# Patient Record
Sex: FEMALE | Race: WHITE | NOT HISPANIC OR LATINO | URBAN - METROPOLITAN AREA
[De-identification: names, ages, dates, MRNs, and addresses within clinical notes are randomized per-mention and may not be internally consistent; named-entity substitution may affect disease eponyms.]

---

## 2017-02-23 ENCOUNTER — ALLSCRIPTS OFFICE VISIT (OUTPATIENT)
Dept: OTHER | Facility: OTHER | Age: 52
End: 2017-02-23

## 2017-02-23 DIAGNOSIS — E03.9 HYPOTHYROIDISM: ICD-10-CM

## 2017-02-23 DIAGNOSIS — G43.909 MIGRAINE WITHOUT STATUS MIGRAINOSUS, NOT INTRACTABLE: ICD-10-CM

## 2017-09-19 ENCOUNTER — ALLSCRIPTS OFFICE VISIT (OUTPATIENT)
Dept: OTHER | Facility: OTHER | Age: 52
End: 2017-09-19

## 2017-09-19 DIAGNOSIS — R92.2 INCONCLUSIVE MAMMOGRAM: ICD-10-CM

## 2017-09-19 DIAGNOSIS — Z12.31 ENCOUNTER FOR SCREENING MAMMOGRAM FOR MALIGNANT NEOPLASM OF BREAST: ICD-10-CM

## 2017-09-19 PROCEDURE — G0145 SCR C/V CYTO,THINLAYER,RESCR: HCPCS | Performed by: OBSTETRICS & GYNECOLOGY

## 2017-09-19 PROCEDURE — 87624 HPV HI-RISK TYP POOLED RSLT: CPT | Performed by: OBSTETRICS & GYNECOLOGY

## 2017-09-20 ENCOUNTER — LAB REQUISITION (OUTPATIENT)
Dept: LAB | Facility: HOSPITAL | Age: 52
End: 2017-09-20
Payer: COMMERCIAL

## 2017-09-20 DIAGNOSIS — Z01.419 ENCOUNTER FOR GYNECOLOGICAL EXAMINATION WITHOUT ABNORMAL FINDING: ICD-10-CM

## 2017-09-22 LAB — HPV RRNA GENITAL QL NAA+PROBE: NORMAL

## 2017-09-28 LAB
LAB AP GYN PRIMARY INTERPRETATION: NORMAL
Lab: NORMAL

## 2018-01-13 VITALS
DIASTOLIC BLOOD PRESSURE: 70 MMHG | SYSTOLIC BLOOD PRESSURE: 114 MMHG | BODY MASS INDEX: 25.34 KG/M2 | WEIGHT: 143 LBS | HEIGHT: 63 IN

## 2018-01-14 VITALS
DIASTOLIC BLOOD PRESSURE: 70 MMHG | SYSTOLIC BLOOD PRESSURE: 108 MMHG | TEMPERATURE: 98.7 F | BODY MASS INDEX: 25.34 KG/M2 | HEART RATE: 68 BPM | WEIGHT: 143 LBS | OXYGEN SATURATION: 97 % | HEIGHT: 63 IN

## 2018-03-21 PROBLEM — I49.3 PVC (PREMATURE VENTRICULAR CONTRACTION): Status: ACTIVE | Noted: 2017-02-23

## 2018-03-21 PROBLEM — G43.909 MIGRAINE: Status: ACTIVE | Noted: 2017-02-23

## 2018-03-21 PROBLEM — E03.9 HYPOTHYROIDISM, ADULT: Status: ACTIVE | Noted: 2017-02-23

## 2018-03-22 ENCOUNTER — APPOINTMENT (OUTPATIENT)
Dept: LAB | Facility: CLINIC | Age: 53
End: 2018-03-22
Payer: COMMERCIAL

## 2018-03-22 ENCOUNTER — TRANSCRIBE ORDERS (OUTPATIENT)
Dept: LAB | Facility: CLINIC | Age: 53
End: 2018-03-22

## 2018-03-22 ENCOUNTER — OFFICE VISIT (OUTPATIENT)
Dept: INTERNAL MEDICINE CLINIC | Facility: CLINIC | Age: 53
End: 2018-03-22
Payer: COMMERCIAL

## 2018-03-22 VITALS
HEART RATE: 62 BPM | RESPIRATION RATE: 16 BRPM | DIASTOLIC BLOOD PRESSURE: 68 MMHG | WEIGHT: 146.8 LBS | HEIGHT: 62 IN | BODY MASS INDEX: 27.02 KG/M2 | OXYGEN SATURATION: 99 % | SYSTOLIC BLOOD PRESSURE: 130 MMHG

## 2018-03-22 DIAGNOSIS — I49.3 PVC (PREMATURE VENTRICULAR CONTRACTION): ICD-10-CM

## 2018-03-22 DIAGNOSIS — Z13.6 SCREENING FOR HEART DISEASE: ICD-10-CM

## 2018-03-22 DIAGNOSIS — G43.909 MIGRAINE WITHOUT STATUS MIGRAINOSUS, NOT INTRACTABLE, UNSPECIFIED MIGRAINE TYPE: ICD-10-CM

## 2018-03-22 DIAGNOSIS — Z12.39 SCREENING FOR MALIGNANT NEOPLASM OF BREAST: ICD-10-CM

## 2018-03-22 DIAGNOSIS — Z12.11 SCREENING FOR COLON CANCER: ICD-10-CM

## 2018-03-22 DIAGNOSIS — E03.9 HYPOTHYROIDISM, ADULT: ICD-10-CM

## 2018-03-22 DIAGNOSIS — E03.9 HYPOTHYROIDISM, ADULT: Primary | ICD-10-CM

## 2018-03-22 LAB
ALBUMIN SERPL BCP-MCNC: 3.6 G/DL (ref 3.5–5)
ALP SERPL-CCNC: 69 U/L (ref 46–116)
ALT SERPL W P-5'-P-CCNC: 15 U/L (ref 12–78)
ANION GAP SERPL CALCULATED.3IONS-SCNC: 7 MMOL/L (ref 4–13)
AST SERPL W P-5'-P-CCNC: 10 U/L (ref 5–45)
BASOPHILS # BLD AUTO: 0.05 THOUSANDS/ΜL (ref 0–0.1)
BASOPHILS NFR BLD AUTO: 1 % (ref 0–1)
BILIRUB SERPL-MCNC: 0.41 MG/DL (ref 0.2–1)
BUN SERPL-MCNC: 14 MG/DL (ref 5–25)
CALCIUM SERPL-MCNC: 8.9 MG/DL (ref 8.3–10.1)
CHLORIDE SERPL-SCNC: 106 MMOL/L (ref 100–108)
CHOLEST SERPL-MCNC: 221 MG/DL (ref 50–200)
CO2 SERPL-SCNC: 26 MMOL/L (ref 21–32)
CREAT SERPL-MCNC: 0.8 MG/DL (ref 0.6–1.3)
EOSINOPHIL # BLD AUTO: 0.22 THOUSAND/ΜL (ref 0–0.61)
EOSINOPHIL NFR BLD AUTO: 4 % (ref 0–6)
ERYTHROCYTE [DISTWIDTH] IN BLOOD BY AUTOMATED COUNT: 13.6 % (ref 11.6–15.1)
GFR SERPL CREATININE-BSD FRML MDRD: 85 ML/MIN/1.73SQ M
GLUCOSE SERPL-MCNC: 93 MG/DL (ref 65–140)
HCT VFR BLD AUTO: 41.7 % (ref 34.8–46.1)
HDLC SERPL-MCNC: 62 MG/DL (ref 40–60)
HGB BLD-MCNC: 13.8 G/DL (ref 11.5–15.4)
LDLC SERPL CALC-MCNC: 135 MG/DL (ref 0–100)
LYMPHOCYTES # BLD AUTO: 2.15 THOUSANDS/ΜL (ref 0.6–4.47)
LYMPHOCYTES NFR BLD AUTO: 34 % (ref 14–44)
MCH RBC QN AUTO: 29.2 PG (ref 26.8–34.3)
MCHC RBC AUTO-ENTMCNC: 33.1 G/DL (ref 31.4–37.4)
MCV RBC AUTO: 88 FL (ref 82–98)
MONOCYTES # BLD AUTO: 0.55 THOUSAND/ΜL (ref 0.17–1.22)
MONOCYTES NFR BLD AUTO: 9 % (ref 4–12)
NEUTROPHILS # BLD AUTO: 3.36 THOUSANDS/ΜL (ref 1.85–7.62)
NEUTS SEG NFR BLD AUTO: 52 % (ref 43–75)
NRBC BLD AUTO-RTO: 0 /100 WBCS
PLATELET # BLD AUTO: 240 THOUSANDS/UL (ref 149–390)
PMV BLD AUTO: 11.8 FL (ref 8.9–12.7)
POTASSIUM SERPL-SCNC: 4.3 MMOL/L (ref 3.5–5.3)
PROT SERPL-MCNC: 7.2 G/DL (ref 6.4–8.2)
RBC # BLD AUTO: 4.73 MILLION/UL (ref 3.81–5.12)
SODIUM SERPL-SCNC: 139 MMOL/L (ref 136–145)
T4 FREE SERPL-MCNC: 1.03 NG/DL (ref 0.76–1.46)
TRIGL SERPL-MCNC: 120 MG/DL
TSH SERPL DL<=0.05 MIU/L-ACNC: 2.63 UIU/ML (ref 0.36–3.74)
WBC # BLD AUTO: 6.35 THOUSAND/UL (ref 4.31–10.16)

## 2018-03-22 PROCEDURE — 3008F BODY MASS INDEX DOCD: CPT | Performed by: PHYSICIAN ASSISTANT

## 2018-03-22 PROCEDURE — 99214 OFFICE O/P EST MOD 30 MIN: CPT | Performed by: PHYSICIAN ASSISTANT

## 2018-03-22 PROCEDURE — 36415 COLL VENOUS BLD VENIPUNCTURE: CPT

## 2018-03-22 PROCEDURE — 84443 ASSAY THYROID STIM HORMONE: CPT

## 2018-03-22 PROCEDURE — 80061 LIPID PANEL: CPT

## 2018-03-22 PROCEDURE — 85025 COMPLETE CBC W/AUTO DIFF WBC: CPT

## 2018-03-22 PROCEDURE — 80053 COMPREHEN METABOLIC PANEL: CPT

## 2018-03-22 PROCEDURE — 84439 ASSAY OF FREE THYROXINE: CPT

## 2018-03-22 RX ORDER — LEVOTHYROXINE SODIUM 0.12 MG/1
125 TABLET ORAL DAILY
Qty: 90 TABLET | Refills: 1 | Status: SHIPPED | OUTPATIENT
Start: 2018-03-22 | End: 2018-09-17 | Stop reason: SDUPTHER

## 2018-03-22 RX ORDER — PROPRANOLOL HYDROCHLORIDE 120 MG/1
120 CAPSULE, EXTENDED RELEASE ORAL DAILY
Qty: 90 CAPSULE | Refills: 1 | Status: SHIPPED | OUTPATIENT
Start: 2018-03-22 | End: 2018-09-17 | Stop reason: SDUPTHER

## 2018-03-22 NOTE — PATIENT INSTRUCTIONS
Will have patient do screening labs and her thyroid functions  Will reorder her medications  Will also order mammogram   Follow-up as needed and at least yearly  Will also give referral for colonoscopy

## 2018-03-22 NOTE — PROGRESS NOTES
Assessment/Plan:  Patient Instructions   Will have patient do screening labs and her thyroid functions  Will reorder her medications  Will also order mammogram   Follow-up as needed and at least yearly  Will also give referral for colonoscopy  Followup scheduled per orders  Diagnoses and all orders for this visit:    Hypothyroidism, adult  -     levothyroxine 125 mcg tablet; Take 1 tablet (125 mcg total) by mouth daily  -     TSH, 3rd generation; Future  -     T4, free; Future    PVC (premature ventricular contraction)  -     propranolol (INDERAL LA) 120 mg 24 hr capsule; Take 1 capsule (120 mg total) by mouth daily  -     CBC and differential; Future  -     Comprehensive metabolic panel; Future    Migraine without status migrainosus, not intractable, unspecified migraine type  -     propranolol (INDERAL LA) 120 mg 24 hr capsule; Take 1 capsule (120 mg total) by mouth daily    Screening for heart disease  -     Lipid panel; Future    Screening for malignant neoplasm of breast  -     Mammo screening bilateral w cad; Future    Screening for colon cancer  -     Ambulatory referral to Colorectal Surgery; Future          Subjective:      Patient ID: Kit Keyes is a 46 y o  female  Follow-up visit    Hypothyroidism:  Asymptomatic, however never did her labs from last year  She is in agreement to get him done this morning  History of PVCs:  This may have been related to her hypothyroidism, however is on propranolol for migraine prophylaxis and this is also controlled PVCs  Migraines:  Since menopause she states she has had less frequent headaches  The propranolol is very effective  Denies side effects  Health screening:  Up-to-date gyn  Needs to do a mammogram   Also needs to have colonoscopy done  Overall feels well, no focal concerns today          ALLERGIES:  Allergies no known allergies    CURRENT MEDICATIONS:    Current Outpatient Prescriptions:     propranolol (INDERAL LA) 120 mg 24 hr capsule, Take 1 capsule (120 mg total) by mouth daily, Disp: 90 capsule, Rfl: 1    SUMAtriptan (IMITREX) 5 MG/ACT nasal spray, into each nostril, Disp: , Rfl:     levothyroxine 125 mcg tablet, Take 1 tablet (125 mcg total) by mouth daily, Disp: 90 tablet, Rfl: 1    ACTIVE PROBLEM LIST:  Patient Active Problem List   Diagnosis    Hypothyroidism, adult    Migraine    PVC (premature ventricular contraction)       PAST MEDICAL HISTORY:  No past medical history on file  PAST SURGICAL HISTORY:  No past surgical history on file  FAMILY HISTORY:  No family history on file  SOCIAL HISTORY:  Social History     Social History    Marital status: /Civil Union     Spouse name: N/A    Number of children: N/A    Years of education: N/A     Occupational History    Not on file  Social History Main Topics    Smoking status: Current Every Day Smoker    Smokeless tobacco: Never Used    Alcohol use Not on file    Drug use: Unknown    Sexual activity: Not on file     Other Topics Concern    Not on file     Social History Narrative    No narrative on file       Review of Systems   Constitutional: Negative for activity change, chills, fatigue and fever  HENT: Negative for congestion  Eyes: Negative for discharge  Respiratory: Negative for cough, chest tightness and shortness of breath  Cardiovascular: Negative for chest pain, palpitations and leg swelling  Gastrointestinal: Negative for abdominal pain  Genitourinary: Negative for difficulty urinating  Musculoskeletal: Negative for arthralgias and myalgias  Skin: Negative for rash  Allergic/Immunologic: Negative for immunocompromised state  Neurological: Negative for dizziness, syncope, weakness, light-headedness and headaches  Hematological: Negative for adenopathy  Does not bruise/bleed easily  Psychiatric/Behavioral: Negative for dysphoric mood  The patient is not nervous/anxious            Objective:  Vitals: 03/22/18 1059   BP: 130/68   BP Location: Left arm   Patient Position: Sitting   Cuff Size: Adult   Pulse: 62   Resp: 16   SpO2: 99%   Weight: 66 6 kg (146 lb 12 8 oz)   Height: 5' 2" (1 575 m)        Physical Exam   Constitutional: She is oriented to person, place, and time  She appears well-developed and well-nourished  No distress  Neck: Neck supple  No JVD present  Carotid bruit is not present  No thyromegaly present  Cardiovascular: Normal rate, regular rhythm and normal heart sounds  Pulmonary/Chest: Effort normal and breath sounds normal    Abdominal: Soft  There is no tenderness  Musculoskeletal: She exhibits no edema  Lymphadenopathy:     She has no cervical adenopathy  Neurological: She is alert and oriented to person, place, and time  Skin: Skin is warm and dry  No rash noted  Psychiatric: She has a normal mood and affect  Her behavior is normal    Nursing note and vitals reviewed  RESULTS:    No results found for this or any previous visit (from the past 1008 hour(s))

## 2018-09-17 DIAGNOSIS — I49.3 PVC (PREMATURE VENTRICULAR CONTRACTION): ICD-10-CM

## 2018-09-17 DIAGNOSIS — E03.9 HYPOTHYROIDISM, ADULT: ICD-10-CM

## 2018-09-17 DIAGNOSIS — G43.909 MIGRAINE WITHOUT STATUS MIGRAINOSUS, NOT INTRACTABLE, UNSPECIFIED MIGRAINE TYPE: ICD-10-CM

## 2018-09-17 RX ORDER — LEVOTHYROXINE SODIUM 0.12 MG/1
TABLET ORAL
Qty: 90 TABLET | Refills: 1 | Status: SHIPPED | OUTPATIENT
Start: 2018-09-17 | End: 2018-09-24 | Stop reason: SDUPTHER

## 2018-09-17 RX ORDER — PROPRANOLOL HYDROCHLORIDE 120 MG/1
CAPSULE, EXTENDED RELEASE ORAL
Qty: 90 CAPSULE | Refills: 1 | Status: SHIPPED | OUTPATIENT
Start: 2018-09-17 | End: 2018-09-24 | Stop reason: SDUPTHER

## 2018-09-24 DIAGNOSIS — I49.3 PVC (PREMATURE VENTRICULAR CONTRACTION): ICD-10-CM

## 2018-09-24 DIAGNOSIS — E03.9 HYPOTHYROIDISM, ADULT: ICD-10-CM

## 2018-09-24 DIAGNOSIS — G43.909 MIGRAINE WITHOUT STATUS MIGRAINOSUS, NOT INTRACTABLE, UNSPECIFIED MIGRAINE TYPE: ICD-10-CM

## 2018-09-24 RX ORDER — LEVOTHYROXINE SODIUM 0.12 MG/1
125 TABLET ORAL DAILY
Qty: 90 TABLET | Refills: 1 | Status: SHIPPED | OUTPATIENT
Start: 2018-09-24 | End: 2019-09-17 | Stop reason: SDUPTHER

## 2018-09-24 RX ORDER — PROPRANOLOL HYDROCHLORIDE 120 MG/1
120 CAPSULE, EXTENDED RELEASE ORAL DAILY
Qty: 90 CAPSULE | Refills: 1 | Status: SHIPPED | OUTPATIENT
Start: 2018-09-24 | End: 2019-09-17 | Stop reason: SDUPTHER

## 2018-09-24 RX ORDER — SUMATRIPTAN 5 MG/1
1 SPRAY NASAL EVERY 2 HOUR PRN
Qty: 1 INHALER | Refills: 3 | Status: SHIPPED | OUTPATIENT
Start: 2018-09-24 | End: 2019-09-16 | Stop reason: SDUPTHER

## 2019-03-20 DIAGNOSIS — I49.3 PVC (PREMATURE VENTRICULAR CONTRACTION): ICD-10-CM

## 2019-03-20 DIAGNOSIS — G43.909 MIGRAINE WITHOUT STATUS MIGRAINOSUS, NOT INTRACTABLE, UNSPECIFIED MIGRAINE TYPE: ICD-10-CM

## 2019-03-20 RX ORDER — PROPRANOLOL HYDROCHLORIDE 120 MG/1
CAPSULE, EXTENDED RELEASE ORAL
Qty: 90 CAPSULE | Refills: 1 | Status: SHIPPED | OUTPATIENT
Start: 2019-03-20 | End: 2019-09-16 | Stop reason: SDUPTHER

## 2019-03-20 NOTE — TELEPHONE ENCOUNTER
It has been 1 year that she has been in here to be seen  She needs to have an appointment for follow-up

## 2019-03-21 DIAGNOSIS — E03.9 HYPOTHYROIDISM, ADULT: ICD-10-CM

## 2019-03-21 RX ORDER — LEVOTHYROXINE SODIUM 0.12 MG/1
TABLET ORAL
Qty: 30 TABLET | Refills: 1 | Status: SHIPPED | OUTPATIENT
Start: 2019-03-21 | End: 2019-04-17 | Stop reason: SDUPTHER

## 2019-04-17 DIAGNOSIS — E03.9 HYPOTHYROIDISM, ADULT: ICD-10-CM

## 2019-04-17 RX ORDER — LEVOTHYROXINE SODIUM 0.12 MG/1
TABLET ORAL
Qty: 30 TABLET | Refills: 0 | Status: SHIPPED | OUTPATIENT
Start: 2019-04-17 | End: 2019-05-22 | Stop reason: SDUPTHER

## 2019-05-22 DIAGNOSIS — E03.9 HYPOTHYROIDISM, ADULT: ICD-10-CM

## 2019-05-22 RX ORDER — LEVOTHYROXINE SODIUM 0.12 MG/1
TABLET ORAL
Qty: 30 TABLET | Refills: 0 | Status: SHIPPED | OUTPATIENT
Start: 2019-05-22 | End: 2019-06-26 | Stop reason: SDUPTHER

## 2019-06-26 DIAGNOSIS — E03.9 HYPOTHYROIDISM, ADULT: ICD-10-CM

## 2019-06-26 RX ORDER — LEVOTHYROXINE SODIUM 0.12 MG/1
TABLET ORAL
Qty: 30 TABLET | Refills: 0 | Status: SHIPPED | OUTPATIENT
Start: 2019-06-26 | End: 2019-09-16 | Stop reason: SDUPTHER

## 2019-09-16 DIAGNOSIS — E03.9 HYPOTHYROIDISM, ADULT: ICD-10-CM

## 2019-09-16 DIAGNOSIS — G43.909 MIGRAINE WITHOUT STATUS MIGRAINOSUS, NOT INTRACTABLE, UNSPECIFIED MIGRAINE TYPE: ICD-10-CM

## 2019-09-16 DIAGNOSIS — I49.3 PVC (PREMATURE VENTRICULAR CONTRACTION): ICD-10-CM

## 2019-09-16 NOTE — TELEPHONE ENCOUNTER
Sumatriptan 5 mg/act nasal spray-1 spary into each nostril every 2 hous as needed for migraine    Levothyroxine 125 mcg tablet-take 1 tablett by mouth daily    Propranolol 120 mg 24 hr capsule-take 1 capsule by mouth daily    [facility] [address]

## 2019-09-16 NOTE — TELEPHONE ENCOUNTER
Patient has not been here in a year and half  Please see refill message from June 26th  No refills without appointment

## 2019-09-17 DIAGNOSIS — G43.909 MIGRAINE WITHOUT STATUS MIGRAINOSUS, NOT INTRACTABLE, UNSPECIFIED MIGRAINE TYPE: ICD-10-CM

## 2019-09-17 DIAGNOSIS — I49.3 PVC (PREMATURE VENTRICULAR CONTRACTION): ICD-10-CM

## 2019-09-17 RX ORDER — SUMATRIPTAN 5 MG/1
1 SPRAY NASAL EVERY 2 HOUR PRN
Qty: 1 INHALER | Refills: 1 | Status: SHIPPED | OUTPATIENT
Start: 2019-09-17 | End: 2019-09-25 | Stop reason: SDUPTHER

## 2019-09-17 RX ORDER — LEVOTHYROXINE SODIUM 0.12 MG/1
125 TABLET ORAL DAILY
Qty: 30 TABLET | Refills: 0 | Status: SHIPPED | OUTPATIENT
Start: 2019-09-17 | End: 2019-09-25 | Stop reason: SDUPTHER

## 2019-09-17 RX ORDER — PROPRANOLOL HYDROCHLORIDE 120 MG/1
CAPSULE, EXTENDED RELEASE ORAL
Qty: 90 CAPSULE | Refills: 1 | OUTPATIENT
Start: 2019-09-17

## 2019-09-17 RX ORDER — PROPRANOLOL HYDROCHLORIDE 120 MG/1
120 CAPSULE, EXTENDED RELEASE ORAL DAILY
Qty: 30 CAPSULE | Refills: 0 | Status: SHIPPED | OUTPATIENT
Start: 2019-09-17 | End: 2019-09-25 | Stop reason: SDUPTHER

## 2019-09-17 NOTE — TELEPHONE ENCOUNTER
Called patient to inform her that she needs and a appt  Patient said she doesn't care but that she needs the Imitrex filled by tomorrow due to she can not be with out it!! Patient scheduled an appt for 9/23  Patient said that she is starting to have a panic attack just thinking about not getting her medication  Patient would like to be notified ASAP of when the script is sent    Best contact # [phone]

## 2019-09-17 NOTE — TELEPHONE ENCOUNTER
Patient has not been seen in over 1 year and needs a follow-up appointment in order to reorder medications

## 2019-09-25 ENCOUNTER — APPOINTMENT (OUTPATIENT)
Dept: LAB | Facility: CLINIC | Age: 54
End: 2019-09-25
Payer: COMMERCIAL

## 2019-09-25 ENCOUNTER — TRANSCRIBE ORDERS (OUTPATIENT)
Dept: LAB | Facility: CLINIC | Age: 54
End: 2019-09-25

## 2019-09-25 ENCOUNTER — OFFICE VISIT (OUTPATIENT)
Dept: INTERNAL MEDICINE CLINIC | Facility: CLINIC | Age: 54
End: 2019-09-25
Payer: COMMERCIAL

## 2019-09-25 VITALS
BODY MASS INDEX: 27.42 KG/M2 | RESPIRATION RATE: 18 BRPM | HEART RATE: 58 BPM | DIASTOLIC BLOOD PRESSURE: 80 MMHG | OXYGEN SATURATION: 98 % | WEIGHT: 149 LBS | HEIGHT: 62 IN | SYSTOLIC BLOOD PRESSURE: 134 MMHG

## 2019-09-25 DIAGNOSIS — E55.9 VITAMIN D DEFICIENCY: Primary | ICD-10-CM

## 2019-09-25 DIAGNOSIS — E78.5 HYPERLIPIDEMIA, UNSPECIFIED HYPERLIPIDEMIA TYPE: ICD-10-CM

## 2019-09-25 DIAGNOSIS — M79.10 MYALGIA: ICD-10-CM

## 2019-09-25 DIAGNOSIS — Z12.39 SCREENING FOR BREAST CANCER: ICD-10-CM

## 2019-09-25 DIAGNOSIS — I49.3 PVC (PREMATURE VENTRICULAR CONTRACTION): ICD-10-CM

## 2019-09-25 DIAGNOSIS — E03.9 HYPOTHYROIDISM, ADULT: Primary | ICD-10-CM

## 2019-09-25 DIAGNOSIS — G43.909 MIGRAINE WITHOUT STATUS MIGRAINOSUS, NOT INTRACTABLE, UNSPECIFIED MIGRAINE TYPE: ICD-10-CM

## 2019-09-25 DIAGNOSIS — E03.9 HYPOTHYROIDISM, ADULT: ICD-10-CM

## 2019-09-25 DIAGNOSIS — Z12.12 SCREENING FOR COLORECTAL CANCER: ICD-10-CM

## 2019-09-25 DIAGNOSIS — Z12.11 SCREENING FOR COLORECTAL CANCER: ICD-10-CM

## 2019-09-25 LAB
25(OH)D3 SERPL-MCNC: 19.6 NG/ML (ref 30–100)
ALBUMIN SERPL BCP-MCNC: 3.5 G/DL (ref 3.5–5)
ALP SERPL-CCNC: 67 U/L (ref 46–116)
ALT SERPL W P-5'-P-CCNC: 15 U/L (ref 12–78)
ANION GAP SERPL CALCULATED.3IONS-SCNC: 5 MMOL/L (ref 4–13)
AST SERPL W P-5'-P-CCNC: 7 U/L (ref 5–45)
BASOPHILS # BLD AUTO: 0.07 THOUSANDS/ΜL (ref 0–0.1)
BASOPHILS NFR BLD AUTO: 1 % (ref 0–1)
BILIRUB SERPL-MCNC: 0.29 MG/DL (ref 0.2–1)
BUN SERPL-MCNC: 15 MG/DL (ref 5–25)
CALCIUM SERPL-MCNC: 9.2 MG/DL (ref 8.3–10.1)
CHLORIDE SERPL-SCNC: 107 MMOL/L (ref 100–108)
CHOLEST SERPL-MCNC: 238 MG/DL (ref 50–200)
CO2 SERPL-SCNC: 25 MMOL/L (ref 21–32)
CREAT SERPL-MCNC: 0.84 MG/DL (ref 0.6–1.3)
EOSINOPHIL # BLD AUTO: 0.21 THOUSAND/ΜL (ref 0–0.61)
EOSINOPHIL NFR BLD AUTO: 3 % (ref 0–6)
ERYTHROCYTE [DISTWIDTH] IN BLOOD BY AUTOMATED COUNT: 14.3 % (ref 11.6–15.1)
GFR SERPL CREATININE-BSD FRML MDRD: 79 ML/MIN/1.73SQ M
GLUCOSE P FAST SERPL-MCNC: 91 MG/DL (ref 65–99)
HCT VFR BLD AUTO: 43 % (ref 34.8–46.1)
HDLC SERPL-MCNC: 61 MG/DL (ref 40–60)
HGB BLD-MCNC: 13.5 G/DL (ref 11.5–15.4)
IMM GRANULOCYTES # BLD AUTO: 0.03 THOUSAND/UL (ref 0–0.2)
IMM GRANULOCYTES NFR BLD AUTO: 1 % (ref 0–2)
LDLC SERPL CALC-MCNC: 163 MG/DL (ref 0–100)
LYMPHOCYTES # BLD AUTO: 2.33 THOUSANDS/ΜL (ref 0.6–4.47)
LYMPHOCYTES NFR BLD AUTO: 36 % (ref 14–44)
MCH RBC QN AUTO: 28.7 PG (ref 26.8–34.3)
MCHC RBC AUTO-ENTMCNC: 31.4 G/DL (ref 31.4–37.4)
MCV RBC AUTO: 91 FL (ref 82–98)
MONOCYTES # BLD AUTO: 0.54 THOUSAND/ΜL (ref 0.17–1.22)
MONOCYTES NFR BLD AUTO: 8 % (ref 4–12)
NEUTROPHILS # BLD AUTO: 3.28 THOUSANDS/ΜL (ref 1.85–7.62)
NEUTS SEG NFR BLD AUTO: 51 % (ref 43–75)
NONHDLC SERPL-MCNC: 177 MG/DL
NRBC BLD AUTO-RTO: 0 /100 WBCS
PLATELET # BLD AUTO: 218 THOUSANDS/UL (ref 149–390)
PMV BLD AUTO: 12 FL (ref 8.9–12.7)
POTASSIUM SERPL-SCNC: 4.5 MMOL/L (ref 3.5–5.3)
PROT SERPL-MCNC: 7.1 G/DL (ref 6.4–8.2)
RBC # BLD AUTO: 4.71 MILLION/UL (ref 3.81–5.12)
SODIUM SERPL-SCNC: 137 MMOL/L (ref 136–145)
T4 FREE SERPL-MCNC: 1.48 NG/DL (ref 0.76–1.46)
TRIGL SERPL-MCNC: 70 MG/DL
TSH SERPL DL<=0.05 MIU/L-ACNC: 3.08 UIU/ML (ref 0.36–3.74)
WBC # BLD AUTO: 6.46 THOUSAND/UL (ref 4.31–10.16)

## 2019-09-25 PROCEDURE — 80053 COMPREHEN METABOLIC PANEL: CPT

## 2019-09-25 PROCEDURE — 85025 COMPLETE CBC W/AUTO DIFF WBC: CPT

## 2019-09-25 PROCEDURE — 82306 VITAMIN D 25 HYDROXY: CPT

## 2019-09-25 PROCEDURE — 80061 LIPID PANEL: CPT

## 2019-09-25 PROCEDURE — 99214 OFFICE O/P EST MOD 30 MIN: CPT | Performed by: PHYSICIAN ASSISTANT

## 2019-09-25 PROCEDURE — 84439 ASSAY OF FREE THYROXINE: CPT

## 2019-09-25 PROCEDURE — 3008F BODY MASS INDEX DOCD: CPT | Performed by: PHYSICIAN ASSISTANT

## 2019-09-25 PROCEDURE — 36415 COLL VENOUS BLD VENIPUNCTURE: CPT

## 2019-09-25 PROCEDURE — 84443 ASSAY THYROID STIM HORMONE: CPT

## 2019-09-25 RX ORDER — PROPRANOLOL HYDROCHLORIDE 120 MG/1
120 CAPSULE, EXTENDED RELEASE ORAL DAILY
Qty: 90 CAPSULE | Refills: 1 | Status: SHIPPED | OUTPATIENT
Start: 2019-09-25 | End: 2020-03-23 | Stop reason: SDUPTHER

## 2019-09-25 RX ORDER — LEVOTHYROXINE SODIUM 0.12 MG/1
125 TABLET ORAL DAILY
Qty: 90 TABLET | Refills: 1 | Status: SHIPPED | OUTPATIENT
Start: 2019-09-25 | End: 2020-06-10

## 2019-09-25 RX ORDER — SUMATRIPTAN 5 MG/1
1 SPRAY NASAL EVERY 2 HOUR PRN
Qty: 1 INHALER | Refills: 5 | Status: SHIPPED | OUTPATIENT
Start: 2019-09-25 | End: 2020-05-27

## 2019-09-25 NOTE — PATIENT INSTRUCTIONS
Will have patient screening labs done, and discuss results when they are available  Will renew medications  No changes at this time  Schedule follow-up in 6 months, sooner as needed

## 2019-10-08 DIAGNOSIS — E03.9 HYPOTHYROIDISM, ADULT: ICD-10-CM

## 2019-10-08 RX ORDER — LEVOTHYROXINE SODIUM 0.12 MG/1
TABLET ORAL
Qty: 90 TABLET | Refills: 1 | Status: SHIPPED | OUTPATIENT
Start: 2019-10-08 | End: 2020-02-26 | Stop reason: SDUPTHER

## 2019-10-23 DIAGNOSIS — G43.909 MIGRAINE WITHOUT STATUS MIGRAINOSUS, NOT INTRACTABLE, UNSPECIFIED MIGRAINE TYPE: ICD-10-CM

## 2019-10-23 NOTE — TELEPHONE ENCOUNTER
Justyna Fillers you can discard this I called the pharmacy and fixed the issue they just wanted to make sure the signature on the nasal spray was correct

## 2019-10-23 NOTE — TELEPHONE ENCOUNTER
Patient stated the pharmacy told her the orders for her Imitrex are wrong, so it cannot be processed

## 2019-10-24 RX ORDER — SUMATRIPTAN 5 MG/1
1 SPRAY NASAL EVERY 2 HOUR PRN
Qty: 1 INHALER | Refills: 0 | OUTPATIENT
Start: 2019-10-24

## 2020-02-26 ENCOUNTER — OFFICE VISIT (OUTPATIENT)
Dept: URGENT CARE | Facility: CLINIC | Age: 55
End: 2020-02-26
Payer: COMMERCIAL

## 2020-02-26 VITALS
DIASTOLIC BLOOD PRESSURE: 70 MMHG | SYSTOLIC BLOOD PRESSURE: 130 MMHG | HEART RATE: 96 BPM | HEIGHT: 62 IN | WEIGHT: 148 LBS | RESPIRATION RATE: 18 BRPM | TEMPERATURE: 98.7 F | BODY MASS INDEX: 27.23 KG/M2 | OXYGEN SATURATION: 97 %

## 2020-02-26 DIAGNOSIS — J11.1 INFLUENZA-LIKE ILLNESS: Primary | ICD-10-CM

## 2020-02-26 PROCEDURE — 99213 OFFICE O/P EST LOW 20 MIN: CPT | Performed by: NURSE PRACTITIONER

## 2020-02-26 RX ORDER — OSELTAMIVIR PHOSPHATE 75 MG/1
75 CAPSULE ORAL 2 TIMES DAILY
Qty: 10 CAPSULE | Refills: 0 | Status: SHIPPED | OUTPATIENT
Start: 2020-02-26 | End: 2020-03-02

## 2020-02-26 NOTE — PROGRESS NOTES
[facility]        NAME: Sandrine Gardner is a 47 y o  female  : 1965    MRN: [id number]  DATE: 2020  TIME: 1:29 PM    Assessment and Plan   Influenza-like illness [R69]  1  Influenza-like illness  oseltamivir (TAMIFLU) 75 mg capsule         Patient Instructions     Patient Instructions      Take medication as directed  Rest and drink plenty of fluids  A cool mist humidifier can be helpful  If you develop a prolonged high fever, worsening cough, chest pain, shortness of breath, palpitations, severe headache, dizziness, you  pass out, any new or concerning symptoms please return or proceed ER  Recommend follow-up with PCP in 3-5 days      Influenza   WHAT YOU NEED TO KNOW:   Influenza (the flu) is an infection caused by the influenza virus  The flu is easily spread when an infected person coughs, sneezes, or has close contact with others  You may be able to spread the flu to others for 1 week or longer after signs or symptoms appear  DISCHARGE INSTRUCTIONS:   Call 911 for any of the following:   · You have trouble breathing, and your lips look purple or blue  · You have a seizure  Return to the emergency department if:   · You are dizzy, or you are urinating less or not at all  · You have a headache with a stiff neck, and you feel tired or confused  · You have new pain or pressure in your chest     · Your symptoms, such as shortness of breath, vomiting, or diarrhea, get worse  · Your symptoms, such as fever and coughing, seem to get better, but then get worse  Contact your healthcare provider if:   · You have new muscle pain or weakness  · You have questions or concerns about your condition or care  Medicines: You may need any of the following:  · Acetaminophen  decreases pain and fever  It is available without a doctor's order  Ask how much to take and how often to take it  Follow directions   Acetaminophen can cause liver damage if not taken correctly  · NSAIDs , such as ibuprofen, help decrease swelling, pain, and fever  This medicine is available with or without a doctor's order  NSAIDs can cause stomach bleeding or kidney problems in certain people  If you take blood thinner medicine, always ask your healthcare provider if NSAIDs are safe for you  Always read the medicine label and follow directions  · Antivirals  help fight a viral infection  · Take your medicine as directed  Contact your healthcare provider if you think your medicine is not helping or if you have side effects  Tell him or her if you are allergic to any medicine  Keep a list of the medicines, vitamins, and herbs you take  Include the amounts, and when and why you take them  Bring the list or the pill bottles to follow-up visits  Carry your medicine list with you in case of an emergency  Rest  as much as you can to help you recover  Drink liquids as directed  to help prevent dehydration  Ask how much liquid to drink each day and which liquids are best for you  Prevent the spread of influenza:   · Wash your hands often  Use soap and water  Wash your hands after you use the bathroom, change a child's diapers, or sneeze  Wash your hands before you prepare or eat food  Use gel hand cleanser when soap and water are not available  Do not touch your eyes, nose, or mouth unless you have washed your hands first            · Cover your mouth when you sneeze or cough  Cough into a tissue or the bend of your arm  · Clean shared items with a germ-killing   Clean table surfaces, doorknobs, and light switches  Do not share towels, silverware, and dishes with people who are sick  Wash bed sheets, towels, silverware, and dishes with soap and water  · Wear a mask  over your mouth and nose if you are sick or are near anyone who is sick  · Stay away from others  if you are sick  · Influenza vaccine  helps prevent influenza (flu)   Everyone older than 6 months should get a yearly influenza vaccine  Get the vaccine as soon as it is available, usually in September or October each year  Follow up with your healthcare provider as directed:  Write down your questions so you remember to ask them during your visits  © 2017 [employer] Information is for End User's use only and may not be sold, redistributed or otherwise used for commercial purposes  All illustrations and images included in CareNotes® are the copyrighted property of [employer]  or [employer]  The above information is an  only  It is not intended as medical advice for individual conditions or treatments  Talk to your doctor, nurse or pharmacist before following any medical regimen to see if it is safe and effective for you  Follow up with PCP in 3-5 days  Proceed to  ER if symptoms worsen  Chief Complaint     Chief Complaint   Patient presents with    Chills     c/o of chills, body aches, diarrhea, cough, and nausea since yesterday  History of Present Illness       Patient is a 59-year-old female who presents with a 2 day history of subjective fever, chills, body aches  Patient also complaining of nausea and cough and congestion  States the cough is nonproductive  Denies any abdominal pain, vomiting, diarrhea  Denies any chest pain, shortness of breath, abscess, palpitations  Denies any earache, sinus pain or pressure, or sore throat  Patient states that her  was diagnosed with flu 2 days ago  Denies any recent travel  Has not tried any over-the-counter medication  Review of Systems   Review of Systems   Constitutional: Positive for chills and fever  Negative for diaphoresis and fatigue  HENT: Positive for congestion and rhinorrhea  Negative for ear discharge, ear pain, facial swelling, postnasal drip, sinus pressure, sinus pain, sore throat, tinnitus and trouble swallowing  Eyes: Negative      Respiratory: Negative for cough, chest tightness, shortness of breath, wheezing and stridor  Cardiovascular: Negative for chest pain and palpitations  Gastrointestinal: Positive for nausea  Negative for abdominal distention, abdominal pain, anal bleeding, blood in stool, constipation, diarrhea and vomiting  Musculoskeletal: Negative for arthralgias, back pain, joint swelling, myalgias, neck pain and neck stiffness  Neurological: Negative for dizziness, syncope, facial asymmetry, weakness, light-headedness, numbness and headaches           Current Medications       Current Outpatient Medications:     levothyroxine 125 mcg tablet, Take 1 tablet (125 mcg total) by mouth daily, Disp: 90 tablet, Rfl: 1    propranolol (INDERAL LA) 120 mg 24 hr capsule, Take 1 capsule (120 mg total) by mouth daily, Disp: 90 capsule, Rfl: 1    SUMAtriptan (IMITREX) 5 MG/ACT nasal spray, 1 spray (5 mg total) into each nostril every 2 (two) hours as needed for migraine, Disp: 1 Inhaler, Rfl: 5    oseltamivir (TAMIFLU) 75 mg capsule, Take 1 capsule (75 mg total) by mouth 2 (two) times a day for 5 days, Disp: 10 capsule, Rfl: 0    Current Allergies     Allergies as of 02/26/2020    (No Known Allergies)            The following portions of the patient's history were reviewed and updated as appropriate: allergies, current medications, past family history, past medical history, past social history, past surgical history and problem list      Past Medical History:   Diagnosis Date    Herpes zoster     Thyroid cancer ([facility] [address] )        Past Surgical History:   Procedure Laterality Date    THYROIDECTOMY      WRIST FRACTURE SURGERY      Open w hardware       Family History   Problem Relation Age of Onset    Hyperlipidemia Mother     Aortic aneurysm Father     Stroke Father         CVA    Diabetes Father     Kidney cancer Father     Bipolar disorder Sister     Multiple sclerosis Maternal Grandmother     Stroke Maternal Grandfather     Multiple sclerosis Maternal Grandfather     Multiple sclerosis Paternal Grandmother     Other Paternal Grandfather         CVA    Kidney disease Family         Benign hypertensive    Other Family         Carcinosarcoma of the renal pelvis, Headache    Heart disease Family     Hiatal hernia Family     Substance Abuse Neg Hx          Medications have been verified  Objective   /70   Pulse 96   Temp 98 7 °F (37 1 °C) (Oral)   Resp 18   Ht 5' 2" (1 575 m)   Wt 67 1 kg (148 lb)   SpO2 97%   BMI 27 07 kg/m²        Physical Exam     Physical Exam   Constitutional: She is oriented to person, place, and time  She appears well-developed and well-nourished  No distress  HENT:   Head: Normocephalic and atraumatic  Right Ear: Hearing, tympanic membrane, external ear and ear canal normal    Left Ear: Hearing, tympanic membrane, external ear and ear canal normal    Nose: Rhinorrhea present  No mucosal edema  Right sinus exhibits no maxillary sinus tenderness and no frontal sinus tenderness  Left sinus exhibits no maxillary sinus tenderness and no frontal sinus tenderness  Mouth/Throat: Uvula is midline, oropharynx is clear and moist and mucous membranes are normal  No tonsillar exudate  Cardiovascular: Normal rate, regular rhythm, S1 normal, S2 normal, normal heart sounds, intact distal pulses and normal pulses  Pulmonary/Chest: Effort normal and breath sounds normal    Abdominal: Soft  Normal appearance and bowel sounds are normal  She exhibits no distension and no mass  There is no tenderness  There is no rigidity, no rebound, no guarding, no CVA tenderness, no tenderness at McBurney's point and negative Maldonado's sign  Lymphadenopathy:     She has no cervical adenopathy  Neurological: She is alert and oriented to person, place, and time  GCS eye subscore is 4  GCS verbal subscore is 5  GCS motor subscore is 6  Skin: Skin is warm and dry  Capillary refill takes less than 2 seconds  She is not diaphoretic

## 2020-02-26 NOTE — LETTER
February 26, 2020     Patient: Jennifer December   YOB: 1965   Date of Visit: 2/26/2020       To Whom it May Concern:    Alessandro Lipscomb was seen in my clinic on 2/26/2020  She may return to work on 3/1/2020  If you have any questions or concerns, please don't hesitate to call           Sincerely,          LAUREN Harding        CC: No Recipients

## 2020-02-26 NOTE — PATIENT INSTRUCTIONS
Take medication as directed  Rest and drink plenty of fluids  A cool mist humidifier can be helpful  If you develop a prolonged high fever, worsening cough, chest pain, shortness of breath, palpitations, severe headache, dizziness, you  pass out, any new or concerning symptoms please return or proceed ER  Recommend follow-up with PCP in 3-5 days      Influenza   WHAT YOU NEED TO KNOW:   Influenza (the flu) is an infection caused by the influenza virus  The flu is easily spread when an infected person coughs, sneezes, or has close contact with others  You may be able to spread the flu to others for 1 week or longer after signs or symptoms appear  DISCHARGE INSTRUCTIONS:   Call 911 for any of the following:   · You have trouble breathing, and your lips look purple or blue  · You have a seizure  Return to the emergency department if:   · You are dizzy, or you are urinating less or not at all  · You have a headache with a stiff neck, and you feel tired or confused  · You have new pain or pressure in your chest     · Your symptoms, such as shortness of breath, vomiting, or diarrhea, get worse  · Your symptoms, such as fever and coughing, seem to get better, but then get worse  Contact your healthcare provider if:   · You have new muscle pain or weakness  · You have questions or concerns about your condition or care  Medicines: You may need any of the following:  · Acetaminophen  decreases pain and fever  It is available without a doctor's order  Ask how much to take and how often to take it  Follow directions  Acetaminophen can cause liver damage if not taken correctly  · NSAIDs , such as ibuprofen, help decrease swelling, pain, and fever  This medicine is available with or without a doctor's order  NSAIDs can cause stomach bleeding or kidney problems in certain people  If you take blood thinner medicine, always ask your healthcare provider if NSAIDs are safe for you   Always read the medicine label and follow directions  · Antivirals  help fight a viral infection  · Take your medicine as directed  Contact your healthcare provider if you think your medicine is not helping or if you have side effects  Tell him or her if you are allergic to any medicine  Keep a list of the medicines, vitamins, and herbs you take  Include the amounts, and when and why you take them  Bring the list or the pill bottles to follow-up visits  Carry your medicine list with you in case of an emergency  Rest  as much as you can to help you recover  Drink liquids as directed  to help prevent dehydration  Ask how much liquid to drink each day and which liquids are best for you  Prevent the spread of influenza:   · Wash your hands often  Use soap and water  Wash your hands after you use the bathroom, change a child's diapers, or sneeze  Wash your hands before you prepare or eat food  Use gel hand cleanser when soap and water are not available  Do not touch your eyes, nose, or mouth unless you have washed your hands first            · Cover your mouth when you sneeze or cough  Cough into a tissue or the bend of your arm  · Clean shared items with a germ-killing   Clean table surfaces, doorknobs, and light switches  Do not share towels, silverware, and dishes with people who are sick  Wash bed sheets, towels, silverware, and dishes with soap and water  · Wear a mask  over your mouth and nose if you are sick or are near anyone who is sick  · Stay away from others  if you are sick  · Influenza vaccine  helps prevent influenza (flu)  Everyone older than 6 months should get a yearly influenza vaccine  Get the vaccine as soon as it is available, usually in September or October each year  Follow up with your healthcare provider as directed:  Write down your questions so you remember to ask them during your visits     © 2017 [employer] Information is for End User's use only and may not be sold, redistributed or otherwise used for commercial purposes  All illustrations and images included in CareNotes® are the copyrighted property of [employer] Inc  or [employer]  The above information is an  only  It is not intended as medical advice for individual conditions or treatments  Talk to your doctor, nurse or pharmacist before following any medical regimen to see if it is safe and effective for you

## 2020-03-23 DIAGNOSIS — I49.3 PVC (PREMATURE VENTRICULAR CONTRACTION): ICD-10-CM

## 2020-03-23 DIAGNOSIS — G43.909 MIGRAINE WITHOUT STATUS MIGRAINOSUS, NOT INTRACTABLE, UNSPECIFIED MIGRAINE TYPE: ICD-10-CM

## 2020-03-23 RX ORDER — PROPRANOLOL HYDROCHLORIDE 120 MG/1
120 CAPSULE, EXTENDED RELEASE ORAL DAILY
Qty: 90 CAPSULE | Refills: 1 | Status: SHIPPED | OUTPATIENT
Start: 2020-03-23 | End: 2020-04-02

## 2020-04-02 DIAGNOSIS — G43.909 MIGRAINE WITHOUT STATUS MIGRAINOSUS, NOT INTRACTABLE, UNSPECIFIED MIGRAINE TYPE: ICD-10-CM

## 2020-04-02 DIAGNOSIS — I49.3 PVC (PREMATURE VENTRICULAR CONTRACTION): ICD-10-CM

## 2020-04-02 RX ORDER — PROPRANOLOL HYDROCHLORIDE 120 MG/1
CAPSULE, EXTENDED RELEASE ORAL
Qty: 30 CAPSULE | Refills: 1 | Status: SHIPPED | OUTPATIENT
Start: 2020-04-02 | End: 2020-06-29

## 2020-04-06 ENCOUNTER — TELEMEDICINE (OUTPATIENT)
Dept: INTERNAL MEDICINE CLINIC | Facility: CLINIC | Age: 55
End: 2020-04-06
Payer: COMMERCIAL

## 2020-04-06 DIAGNOSIS — E03.9 HYPOTHYROIDISM, ADULT: Primary | ICD-10-CM

## 2020-04-06 DIAGNOSIS — I49.3 PVC (PREMATURE VENTRICULAR CONTRACTION): ICD-10-CM

## 2020-04-06 DIAGNOSIS — Z11.59 NEED FOR HEPATITIS C SCREENING TEST: ICD-10-CM

## 2020-04-06 DIAGNOSIS — E55.9 VITAMIN D DEFICIENCY: ICD-10-CM

## 2020-04-06 DIAGNOSIS — G43.909 MIGRAINE WITHOUT STATUS MIGRAINOSUS, NOT INTRACTABLE, UNSPECIFIED MIGRAINE TYPE: ICD-10-CM

## 2020-04-06 DIAGNOSIS — Z13.6 SCREENING FOR HEART DISEASE: ICD-10-CM

## 2020-04-06 DIAGNOSIS — Z11.4 SCREENING FOR HIV (HUMAN IMMUNODEFICIENCY VIRUS): ICD-10-CM

## 2020-04-06 PROCEDURE — 99214 OFFICE O/P EST MOD 30 MIN: CPT | Performed by: PHYSICIAN ASSISTANT

## 2020-05-27 DIAGNOSIS — G43.909 MIGRAINE WITHOUT STATUS MIGRAINOSUS, NOT INTRACTABLE, UNSPECIFIED MIGRAINE TYPE: ICD-10-CM

## 2020-05-27 RX ORDER — SUMATRIPTAN 5 MG/1
1 SPRAY NASAL 2 TIMES DAILY PRN
Qty: 6 INHALER | Refills: 5 | Status: SHIPPED | OUTPATIENT
Start: 2020-05-27 | End: 2020-10-08 | Stop reason: SDUPTHER

## 2020-06-10 DIAGNOSIS — E03.9 HYPOTHYROIDISM, ADULT: ICD-10-CM

## 2020-06-10 RX ORDER — LEVOTHYROXINE SODIUM 0.12 MG/1
TABLET ORAL
Qty: 90 TABLET | Refills: 1 | Status: SHIPPED | OUTPATIENT
Start: 2020-06-10 | End: 2021-04-18

## 2020-06-29 DIAGNOSIS — I49.3 PVC (PREMATURE VENTRICULAR CONTRACTION): ICD-10-CM

## 2020-06-29 DIAGNOSIS — G43.909 MIGRAINE WITHOUT STATUS MIGRAINOSUS, NOT INTRACTABLE, UNSPECIFIED MIGRAINE TYPE: ICD-10-CM

## 2020-06-29 RX ORDER — PROPRANOLOL HYDROCHLORIDE 120 MG/1
CAPSULE, EXTENDED RELEASE ORAL
Qty: 90 CAPSULE | Refills: 1 | Status: SHIPPED | OUTPATIENT
Start: 2020-06-29 | End: 2020-09-20

## 2020-09-20 DIAGNOSIS — I49.3 PVC (PREMATURE VENTRICULAR CONTRACTION): ICD-10-CM

## 2020-09-20 DIAGNOSIS — G43.909 MIGRAINE WITHOUT STATUS MIGRAINOSUS, NOT INTRACTABLE, UNSPECIFIED MIGRAINE TYPE: ICD-10-CM

## 2020-09-20 RX ORDER — PROPRANOLOL HYDROCHLORIDE 120 MG/1
CAPSULE, EXTENDED RELEASE ORAL
Qty: 90 CAPSULE | Refills: 1 | Status: SHIPPED | OUTPATIENT
Start: 2020-09-20 | End: 2020-11-09 | Stop reason: SDUPTHER

## 2020-10-08 ENCOUNTER — APPOINTMENT (OUTPATIENT)
Dept: LAB | Facility: HOSPITAL | Age: 55
End: 2020-10-08
Payer: COMMERCIAL

## 2020-10-08 ENCOUNTER — OFFICE VISIT (OUTPATIENT)
Dept: INTERNAL MEDICINE CLINIC | Facility: CLINIC | Age: 55
End: 2020-10-08
Payer: COMMERCIAL

## 2020-10-08 VITALS
BODY MASS INDEX: 26.87 KG/M2 | WEIGHT: 146 LBS | TEMPERATURE: 98.1 F | DIASTOLIC BLOOD PRESSURE: 74 MMHG | OXYGEN SATURATION: 99 % | HEIGHT: 62 IN | SYSTOLIC BLOOD PRESSURE: 130 MMHG | HEART RATE: 80 BPM

## 2020-10-08 DIAGNOSIS — Z11.59 NEED FOR HEPATITIS C SCREENING TEST: ICD-10-CM

## 2020-10-08 DIAGNOSIS — R14.0 ABDOMINAL BLOATING: ICD-10-CM

## 2020-10-08 DIAGNOSIS — Z11.4 SCREENING FOR HIV (HUMAN IMMUNODEFICIENCY VIRUS): ICD-10-CM

## 2020-10-08 DIAGNOSIS — E03.9 HYPOTHYROIDISM, ADULT: ICD-10-CM

## 2020-10-08 DIAGNOSIS — Z12.11 SCREENING FOR COLON CANCER: ICD-10-CM

## 2020-10-08 DIAGNOSIS — Z12.31 ENCOUNTER FOR SCREENING MAMMOGRAM FOR MALIGNANT NEOPLASM OF BREAST: Primary | ICD-10-CM

## 2020-10-08 DIAGNOSIS — G43.909 MIGRAINE WITHOUT STATUS MIGRAINOSUS, NOT INTRACTABLE, UNSPECIFIED MIGRAINE TYPE: ICD-10-CM

## 2020-10-08 DIAGNOSIS — Z13.6 SCREENING FOR HEART DISEASE: ICD-10-CM

## 2020-10-08 DIAGNOSIS — E55.9 VITAMIN D DEFICIENCY: ICD-10-CM

## 2020-10-08 LAB
25(OH)D3 SERPL-MCNC: 19.7 NG/ML (ref 30–100)
ALBUMIN SERPL BCP-MCNC: 3.6 G/DL (ref 3.5–5)
ALP SERPL-CCNC: 71 U/L (ref 46–116)
ALT SERPL W P-5'-P-CCNC: 18 U/L (ref 12–78)
ANION GAP SERPL CALCULATED.3IONS-SCNC: 8 MMOL/L (ref 4–13)
AST SERPL W P-5'-P-CCNC: 9 U/L (ref 5–45)
BASOPHILS # BLD AUTO: 0.05 THOUSANDS/ΜL (ref 0–0.1)
BASOPHILS NFR BLD AUTO: 1 % (ref 0–1)
BILIRUB SERPL-MCNC: 0.3 MG/DL (ref 0.2–1)
BUN SERPL-MCNC: 8 MG/DL (ref 5–25)
CALCIUM SERPL-MCNC: 9.5 MG/DL (ref 8.3–10.1)
CHLORIDE SERPL-SCNC: 105 MMOL/L (ref 100–108)
CHOLEST SERPL-MCNC: 273 MG/DL (ref 50–200)
CO2 SERPL-SCNC: 27 MMOL/L (ref 21–32)
CREAT SERPL-MCNC: 0.87 MG/DL (ref 0.6–1.3)
EOSINOPHIL # BLD AUTO: 0.16 THOUSAND/ΜL (ref 0–0.61)
EOSINOPHIL NFR BLD AUTO: 3 % (ref 0–6)
ERYTHROCYTE [DISTWIDTH] IN BLOOD BY AUTOMATED COUNT: 15.1 % (ref 11.6–15.1)
GFR SERPL CREATININE-BSD FRML MDRD: 75 ML/MIN/1.73SQ M
GLUCOSE P FAST SERPL-MCNC: 98 MG/DL (ref 65–99)
HCT VFR BLD AUTO: 43.2 % (ref 34.8–46.1)
HCV AB SER QL: NORMAL
HDLC SERPL-MCNC: 62 MG/DL
HGB BLD-MCNC: 13.7 G/DL (ref 11.5–15.4)
IMM GRANULOCYTES # BLD AUTO: 0.01 THOUSAND/UL (ref 0–0.2)
IMM GRANULOCYTES NFR BLD AUTO: 0 % (ref 0–2)
LDLC SERPL CALC-MCNC: 188 MG/DL (ref 0–100)
LYMPHOCYTES # BLD AUTO: 2.38 THOUSANDS/ΜL (ref 0.6–4.47)
LYMPHOCYTES NFR BLD AUTO: 38 % (ref 14–44)
MCH RBC QN AUTO: 28 PG (ref 26.8–34.3)
MCHC RBC AUTO-ENTMCNC: 31.7 G/DL (ref 31.4–37.4)
MCV RBC AUTO: 88 FL (ref 82–98)
MONOCYTES # BLD AUTO: 0.44 THOUSAND/ΜL (ref 0.17–1.22)
MONOCYTES NFR BLD AUTO: 7 % (ref 4–12)
NEUTROPHILS # BLD AUTO: 3.19 THOUSANDS/ΜL (ref 1.85–7.62)
NEUTS SEG NFR BLD AUTO: 51 % (ref 43–75)
NONHDLC SERPL-MCNC: 211 MG/DL
NRBC BLD AUTO-RTO: 0 /100 WBCS
PLATELET # BLD AUTO: 237 THOUSANDS/UL (ref 149–390)
PMV BLD AUTO: 11.1 FL (ref 8.9–12.7)
POTASSIUM SERPL-SCNC: 4.9 MMOL/L (ref 3.5–5.3)
PROT SERPL-MCNC: 7.7 G/DL (ref 6.4–8.2)
RBC # BLD AUTO: 4.9 MILLION/UL (ref 3.81–5.12)
SODIUM SERPL-SCNC: 140 MMOL/L (ref 136–145)
T4 FREE SERPL-MCNC: 1.24 NG/DL (ref 0.76–1.46)
TRIGL SERPL-MCNC: 115 MG/DL
TSH SERPL DL<=0.05 MIU/L-ACNC: 5 UIU/ML (ref 0.36–3.74)
WBC # BLD AUTO: 6.23 THOUSAND/UL (ref 4.31–10.16)

## 2020-10-08 PROCEDURE — 82306 VITAMIN D 25 HYDROXY: CPT

## 2020-10-08 PROCEDURE — 36415 COLL VENOUS BLD VENIPUNCTURE: CPT

## 2020-10-08 PROCEDURE — 84443 ASSAY THYROID STIM HORMONE: CPT

## 2020-10-08 PROCEDURE — 99214 OFFICE O/P EST MOD 30 MIN: CPT | Performed by: PHYSICIAN ASSISTANT

## 2020-10-08 PROCEDURE — 85025 COMPLETE CBC W/AUTO DIFF WBC: CPT

## 2020-10-08 PROCEDURE — 80053 COMPREHEN METABOLIC PANEL: CPT

## 2020-10-08 PROCEDURE — 84439 ASSAY OF FREE THYROXINE: CPT

## 2020-10-08 PROCEDURE — 87389 HIV-1 AG W/HIV-1&-2 AB AG IA: CPT

## 2020-10-08 PROCEDURE — 3725F SCREEN DEPRESSION PERFORMED: CPT | Performed by: PHYSICIAN ASSISTANT

## 2020-10-08 PROCEDURE — 80061 LIPID PANEL: CPT

## 2020-10-08 PROCEDURE — 86803 HEPATITIS C AB TEST: CPT

## 2020-10-08 RX ORDER — SUMATRIPTAN 5 MG/1
1 SPRAY NASAL 2 TIMES DAILY PRN
Qty: 6 INHALER | Refills: 5 | Status: SHIPPED | OUTPATIENT
Start: 2020-10-08 | End: 2020-11-09 | Stop reason: SDUPTHER

## 2020-10-09 LAB — HIV 1+2 AB+HIV1 P24 AG SERPL QL IA: NORMAL

## 2020-11-09 DIAGNOSIS — I49.3 PVC (PREMATURE VENTRICULAR CONTRACTION): ICD-10-CM

## 2020-11-09 DIAGNOSIS — G43.909 MIGRAINE WITHOUT STATUS MIGRAINOSUS, NOT INTRACTABLE, UNSPECIFIED MIGRAINE TYPE: ICD-10-CM

## 2020-11-09 RX ORDER — SUMATRIPTAN 5 MG/1
1 SPRAY NASAL 2 TIMES DAILY PRN
Qty: 6 INHALER | Refills: 5 | Status: SHIPPED | OUTPATIENT
Start: 2020-11-09 | End: 2021-07-05 | Stop reason: SDUPTHER

## 2020-11-09 RX ORDER — PROPRANOLOL HYDROCHLORIDE 120 MG/1
120 CAPSULE, EXTENDED RELEASE ORAL DAILY
Qty: 90 CAPSULE | Refills: 1 | Status: SHIPPED | OUTPATIENT
Start: 2020-11-09 | End: 2021-06-03

## 2020-11-12 ENCOUNTER — TELEPHONE (OUTPATIENT)
Dept: INTERNAL MEDICINE CLINIC | Facility: CLINIC | Age: 55
End: 2020-11-12

## 2020-11-30 NOTE — PROGRESS NOTES
Assessment/Plan:   Patient Instructions   Will have patient screening labs done, and discuss results when they are available  Will renew medications  No changes at this time  Schedule follow-up in 6 months, sooner as needed  BMI Counseling: Body mass index is 27 25 kg/m²  Discussed the patient's BMI with her  The BMI is above normal  Nutrition recommendations include reducing portion sizes and decreasing overall calorie intake  Exercise recommendations include exercising 3-5 times per week  Return in about 6 months (around 3/25/2020) for Next scheduled follow up  Diagnoses and all orders for this visit:    Hypothyroidism, adult  -     T4, free; Future  -     TSH, 3rd generation; Future  -     levothyroxine 125 mcg tablet; Take 1 tablet (125 mcg total) by mouth daily    Migraine without status migrainosus, not intractable, unspecified migraine type  -     SUMAtriptan (IMITREX) 5 MG/ACT nasal spray; 1 spray (5 mg total) into each nostril every 2 (two) hours as needed for migraine  -     CBC and differential; Future  -     Comprehensive metabolic panel; Future  -     propranolol (INDERAL LA) 120 mg 24 hr capsule; Take 1 capsule (120 mg total) by mouth daily    PVC (premature ventricular contraction)  -     CBC and differential; Future  -     Comprehensive metabolic panel; Future  -     propranolol (INDERAL LA) 120 mg 24 hr capsule; Take 1 capsule (120 mg total) by mouth daily    Screening for breast cancer  -     Mammo screening bilateral w 3d & cad; Future    Screening for colorectal cancer  -     Cologuard; Future    Hyperlipidemia, unspecified hyperlipidemia type  -     Lipid panel; Future    Myalgia  -     Vitamin D 25 hydroxy; Future          Subjective:      Patient ID: Дмитрий Cardenas is a 47 y o  female  Follow-up    Patient has been lost to follow-up for over 1 year, basically because she does not go to the doctor unless she has to    Long discussion held on this, and the importance of Patient's O2 saturation noted to be 70% on 6L via Hi-flow nasal cannula.  Provider notified and gave order to increase patient's O2 to 10L and RT notified.   health maintenance and screenings, along with lab monitoring  Patient is do her mammogram and colonoscopy  Has not had her thyroid functions checked in over 1 year  PVCs:  Has not felt any recurrent palpitations  Maggy cp, palp, sob, edema, HA, dizziness, diaphoresis, syncope, visual disturbance  Migraines: These have improved significantly since she has been going through menopause  She states her headaches now generally occur only every several months instead of multiple times a month        ALLERGIES:  No Known Allergies    CURRENT MEDICATIONS:    Current Outpatient Medications:     levothyroxine 125 mcg tablet, Take 1 tablet (125 mcg total) by mouth daily, Disp: 90 tablet, Rfl: 1    propranolol (INDERAL LA) 120 mg 24 hr capsule, Take 1 capsule (120 mg total) by mouth daily, Disp: 90 capsule, Rfl: 1    SUMAtriptan (IMITREX) 5 MG/ACT nasal spray, 1 spray (5 mg total) into each nostril every 2 (two) hours as needed for migraine, Disp: 1 Inhaler, Rfl: 5    ACTIVE PROBLEM LIST:  Patient Active Problem List   Diagnosis    Hypothyroidism, adult    Migraine    PVC (premature ventricular contraction)       PAST MEDICAL HISTORY:  Past Medical History:   Diagnosis Date    Herpes zoster     [facility]roid [facility] ([facility] [address] )        PAST SURGICAL HISTORY:  Past Surgical History:   Procedure Laterality Date    THYROIDECTOMY      WRIST FRACTURE SURGERY      Open w hardware       FAMILY HISTORY:  Family History   Problem Relation Age of Onset    Hyperlipidemia Mother     Aortic aneurysm Father     Stroke Father         CVA    Diabetes Father     Kidney cancer Father     Bipolar disorder Sister     Multiple sclerosis Maternal Grandmother     Stroke Maternal Grandfather     Multiple sclerosis Maternal Grandfather     Multiple sclerosis Paternal Grandmother     Other Paternal Grandfather         CVA    Kidney disease Family         Benign hypertensive    Other Family         Carcinosarcoma of the renal pelvis, Headache    Heart disease Family     Hiatal hernia Family     Substance Abuse Neg Hx        SOCIAL HISTORY:  Social History     Socioeconomic History    Marital status: /Civil Union     Spouse name: Not on file    Number of children: 2    Years of education: Not on file    Highest education level: Not on file   Occupational History    Occupation: [employer]   Social Needs    Financial resource strain: Not on file    Food insecurity:     Worry: Not on file     Inability: Not on file   Heyo needs:     Medical: Not on file     Non-medical: Not on file   Tobacco Use    Smoking status: Current Every Day Smoker     Packs/day: 1 00     Years: 30 00     Pack years: 30 00    Smokeless tobacco: Never Used   Substance and Sexual Activity    Alcohol use: Yes     Alcohol/week: 4 0 - 5 0 standard drinks     Types: 4 - 5 Standard drinks or equivalent per week     Comment: social    Drug use: No    Sexual activity: Yes     Partners: Male   Lifestyle    Physical activity:     Days per week: Not on file     Minutes per session: Not on file    Stress: Not on file   Relationships    Social connections:     Talks on phone: Not on file     Gets together: Not on file     Attends Hinduism service: Not on file     Active member of club or organization: Not on file     Attends meetings of clubs or organizations: Not on file     Relationship status: Not on file    Intimate partner violence:     Fear of current or ex partner: Not on file     Emotionally abused: Not on file     Physically abused: Not on file     Forced sexual activity: Not on file   Other Topics Concern    Not on file   Social History Narrative        2 children    Exercises regularly-walking, weight training    Activities-Boating    Dental care regularly    Brushes teeth twice daily    Caffeine use       Review of Systems   Constitutional: Negative for activity change, chills, fatigue and fever  HENT: Negative for congestion  Eyes: Negative for discharge  Respiratory: Negative for cough, chest tightness and shortness of breath  Cardiovascular: Negative for chest pain, palpitations and leg swelling  Gastrointestinal: Negative for abdominal pain, blood in stool, constipation, diarrhea, nausea and vomiting  Endocrine: Negative for polydipsia, polyphagia and polyuria  Genitourinary: Negative for difficulty urinating  Musculoskeletal: Negative for arthralgias and myalgias  Skin: Negative for rash  Allergic/Immunologic: Negative for immunocompromised state  Neurological: Negative for dizziness, syncope, weakness, light-headedness and headaches  Hematological: Negative for adenopathy  Does not bruise/bleed easily  Psychiatric/Behavioral: Negative for dysphoric mood, sleep disturbance and suicidal ideas  The patient is not nervous/anxious  Objective:  Vitals:    09/25/19 1116   BP: 134/80   BP Location: Left arm   Patient Position: Sitting   Cuff Size: Adult   Pulse: 58   Resp: 18   SpO2: 98%   Weight: 67 6 kg (149 lb)   Height: 5' 2" (1 575 m)     Body mass index is 27 25 kg/m²  Physical Exam   Constitutional: She is oriented to person, place, and time  She appears well-developed and well-nourished  No distress  HENT:   Head: Normocephalic  Eyes: Pupils are equal, round, and reactive to light  EOM are normal    Neck: Neck supple  No JVD present  Carotid bruit is not present  No thyromegaly present  Cardiovascular: Normal rate, regular rhythm and normal heart sounds  Pulmonary/Chest: Effort normal and breath sounds normal    Rare scattered wheeze   Abdominal: Soft  There is no tenderness  Musculoskeletal: She exhibits no edema  Lymphadenopathy:     She has no cervical adenopathy  Neurological: She is alert and oriented to person, place, and time  Skin: Skin is warm and dry  No rash noted  Psychiatric: She has a normal mood and affect   Her behavior is normal    Nursing note and vitals reviewed  RESULTS:    No results found for this or any previous visit (from the past 1008 hour(s))  This note was created with voice recognition software  Phonic, grammatical and spelling errors may be present within the note as a result

## 2020-12-31 ENCOUNTER — APPOINTMENT (EMERGENCY)
Dept: RADIOLOGY | Facility: HOSPITAL | Age: 55
End: 2020-12-31
Payer: COMMERCIAL

## 2020-12-31 ENCOUNTER — HOSPITAL ENCOUNTER (EMERGENCY)
Facility: HOSPITAL | Age: 55
Discharge: HOME | End: 2020-12-31
Attending: EMERGENCY MEDICINE | Admitting: EMERGENCY MEDICINE
Payer: COMMERCIAL

## 2020-12-31 ENCOUNTER — APPOINTMENT (EMERGENCY)
Dept: CT IMAGING | Facility: HOSPITAL | Age: 55
End: 2020-12-31
Payer: COMMERCIAL

## 2020-12-31 VITALS
OXYGEN SATURATION: 96 % | SYSTOLIC BLOOD PRESSURE: 111 MMHG | WEIGHT: 145.94 LBS | RESPIRATION RATE: 20 BRPM | DIASTOLIC BLOOD PRESSURE: 57 MMHG | HEART RATE: 56 BPM | BODY MASS INDEX: 26.69 KG/M2 | TEMPERATURE: 98.2 F

## 2020-12-31 DIAGNOSIS — R42 DIZZINESS: Primary | ICD-10-CM

## 2020-12-31 LAB
ALBUMIN SERPL BCP-MCNC: 3.3 G/DL (ref 3.5–5)
ALP SERPL-CCNC: 62 U/L (ref 46–116)
ALT SERPL W P-5'-P-CCNC: 18 U/L (ref 12–78)
ANION GAP SERPL CALCULATED.3IONS-SCNC: 10 MMOL/L (ref 4–13)
AST SERPL W P-5'-P-CCNC: 14 U/L (ref 5–45)
ATRIAL RATE: 53 BPM
BASOPHILS # BLD AUTO: 0.07 THOUSANDS/ΜL (ref 0–0.1)
BASOPHILS NFR BLD AUTO: 1 % (ref 0–1)
BILIRUB SERPL-MCNC: 0.2 MG/DL (ref 0.2–1)
BUN SERPL-MCNC: 11 MG/DL (ref 5–25)
CALCIUM ALBUM COR SERPL-MCNC: 9.1 MG/DL (ref 8.3–10.1)
CALCIUM SERPL-MCNC: 8.5 MG/DL (ref 8.3–10.1)
CHLORIDE SERPL-SCNC: 104 MMOL/L (ref 100–108)
CO2 SERPL-SCNC: 25 MMOL/L (ref 21–32)
CREAT SERPL-MCNC: 0.82 MG/DL (ref 0.6–1.3)
EOSINOPHIL # BLD AUTO: 0.18 THOUSAND/ΜL (ref 0–0.61)
EOSINOPHIL NFR BLD AUTO: 3 % (ref 0–6)
ERYTHROCYTE [DISTWIDTH] IN BLOOD BY AUTOMATED COUNT: 14.3 % (ref 11.6–15.1)
GFR SERPL CREATININE-BSD FRML MDRD: 81 ML/MIN/1.73SQ M
GLUCOSE SERPL-MCNC: 89 MG/DL (ref 65–140)
HCT VFR BLD AUTO: 46.5 % (ref 34.8–46.1)
HGB BLD-MCNC: 14.8 G/DL (ref 11.5–15.4)
IMM GRANULOCYTES # BLD AUTO: 0.03 THOUSAND/UL (ref 0–0.2)
IMM GRANULOCYTES NFR BLD AUTO: 1 % (ref 0–2)
LYMPHOCYTES # BLD AUTO: 2.6 THOUSANDS/ΜL (ref 0.6–4.47)
LYMPHOCYTES NFR BLD AUTO: 39 % (ref 14–44)
MCH RBC QN AUTO: 28 PG (ref 26.8–34.3)
MCHC RBC AUTO-ENTMCNC: 31.8 G/DL (ref 31.4–37.4)
MCV RBC AUTO: 88 FL (ref 82–98)
MONOCYTES # BLD AUTO: 0.43 THOUSAND/ΜL (ref 0.17–1.22)
MONOCYTES NFR BLD AUTO: 7 % (ref 4–12)
NEUTROPHILS # BLD AUTO: 3.33 THOUSANDS/ΜL (ref 1.85–7.62)
NEUTS SEG NFR BLD AUTO: 49 % (ref 43–75)
NRBC BLD AUTO-RTO: 0 /100 WBCS
P AXIS: 63 DEGREES
PLATELET # BLD AUTO: 296 THOUSANDS/UL (ref 149–390)
PMV BLD AUTO: 11.3 FL (ref 8.9–12.7)
POTASSIUM SERPL-SCNC: 4.4 MMOL/L (ref 3.5–5.3)
PR INTERVAL: 146 MS
PROT SERPL-MCNC: 6.8 G/DL (ref 6.4–8.2)
QRS AXIS: 48 DEGREES
QRSD INTERVAL: 74 MS
QT INTERVAL: 438 MS
QTC INTERVAL: 410 MS
RBC # BLD AUTO: 5.29 MILLION/UL (ref 3.81–5.12)
SODIUM SERPL-SCNC: 139 MMOL/L (ref 136–145)
T WAVE AXIS: 54 DEGREES
TROPONIN I SERPL-MCNC: <0.02 NG/ML
VENTRICULAR RATE: 53 BPM
WBC # BLD AUTO: 6.64 THOUSAND/UL (ref 4.31–10.16)

## 2020-12-31 PROCEDURE — 70498 CT ANGIOGRAPHY NECK: CPT

## 2020-12-31 PROCEDURE — 36415 COLL VENOUS BLD VENIPUNCTURE: CPT | Performed by: EMERGENCY MEDICINE

## 2020-12-31 PROCEDURE — 85025 COMPLETE CBC W/AUTO DIFF WBC: CPT | Performed by: EMERGENCY MEDICINE

## 2020-12-31 PROCEDURE — 70496 CT ANGIOGRAPHY HEAD: CPT

## 2020-12-31 PROCEDURE — 99285 EMERGENCY DEPT VISIT HI MDM: CPT

## 2020-12-31 PROCEDURE — 71046 X-RAY EXAM CHEST 2 VIEWS: CPT

## 2020-12-31 PROCEDURE — 93005 ELECTROCARDIOGRAM TRACING: CPT

## 2020-12-31 PROCEDURE — 80053 COMPREHEN METABOLIC PANEL: CPT | Performed by: EMERGENCY MEDICINE

## 2020-12-31 PROCEDURE — 96365 THER/PROPH/DIAG IV INF INIT: CPT

## 2020-12-31 PROCEDURE — 93010 ELECTROCARDIOGRAM REPORT: CPT | Performed by: INTERNAL MEDICINE

## 2020-12-31 PROCEDURE — 99285 EMERGENCY DEPT VISIT HI MDM: CPT | Performed by: EMERGENCY MEDICINE

## 2020-12-31 PROCEDURE — G1004 CDSM NDSC: HCPCS

## 2020-12-31 PROCEDURE — 84484 ASSAY OF TROPONIN QUANT: CPT | Performed by: EMERGENCY MEDICINE

## 2020-12-31 RX ORDER — SODIUM CHLORIDE, SODIUM GLUCONATE, SODIUM ACETATE, POTASSIUM CHLORIDE, MAGNESIUM CHLORIDE, SODIUM PHOSPHATE, DIBASIC, AND POTASSIUM PHOSPHATE .53; .5; .37; .037; .03; .012; .00082 G/100ML; G/100ML; G/100ML; G/100ML; G/100ML; G/100ML; G/100ML
1000 INJECTION, SOLUTION INTRAVENOUS ONCE
Status: COMPLETED | OUTPATIENT
Start: 2020-12-31 | End: 2020-12-31

## 2020-12-31 RX ORDER — MECLIZINE HYDROCHLORIDE 25 MG/1
25 TABLET ORAL 3 TIMES DAILY PRN
Qty: 30 TABLET | Refills: 0 | Status: SHIPPED | OUTPATIENT
Start: 2020-12-31 | End: 2022-02-16 | Stop reason: ALTCHOICE

## 2020-12-31 RX ADMIN — IOHEXOL 100 ML: 350 INJECTION, SOLUTION INTRAVENOUS at 18:38

## 2020-12-31 RX ADMIN — SODIUM CHLORIDE, SODIUM GLUCONATE, SODIUM ACETATE, POTASSIUM CHLORIDE, MAGNESIUM CHLORIDE, SODIUM PHOSPHATE, DIBASIC, AND POTASSIUM PHOSPHATE 1000 ML: .53; .5; .37; .037; .03; .012; .00082 INJECTION, SOLUTION INTRAVENOUS at 16:22

## 2020-12-31 NOTE — ED PROVIDER NOTES
History  Chief Complaint   Patient presents with    Dizziness     Pt reports having 2 dizzy spells yesterday  she also reports having a headache  NO dizziness currently but feels tired  Pt has also had pain in her left arm at times  HPI  55 yo F with PMH migraines, thyroid cancer, PVCs presents with feeling dizzy yesterday  She states she felt like the room was spinning yesterday and had two episodes when she was sitting watching TV  Has had mild headache  Also with intermittent "twinges" in the left side of her chest when she moves a certain way with pain in left arm  No weakness or numbness  Prior to Admission Medications   Prescriptions Last Dose Informant Patient Reported? Taking?    SUMAtriptan (IMITREX) 5 MG/ACT nasal spray   No No   Si spray (5 mg total) into each nostril 2 (two) times a day as needed for migraine   levothyroxine 125 mcg tablet   No No   Sig: TAKE 1 TABLET BY MOUTH EVERY DAY   propranolol (INDERAL LA) 120 mg 24 hr capsule   No No   Sig: Take 1 capsule (120 mg total) by mouth daily      Facility-Administered Medications: None       Past Medical History:   Diagnosis Date    Herpes zoster     Thyroid cancer ([facility] [address] )        Past Surgical History:   Procedure Laterality Date    THYROIDECTOMY      WRIST FRACTURE SURGERY      Open w hardware       Family History   Problem Relation Age of Onset    Hyperlipidemia Mother     Aortic aneurysm Father     Stroke Father         CVA    Diabetes Father     Kidney cancer Father     Bipolar disorder Sister     Multiple sclerosis Maternal Grandmother     Stroke Maternal Grandfather     Multiple sclerosis Maternal Grandfather     Multiple sclerosis Paternal Grandmother     Other Paternal Grandfather         CVA    Kidney disease Family         Benign hypertensive    Other Family         Carcinosarcoma of the renal pelvis, Headache    Heart disease Family     Hiatal hernia Family     Substance Abuse Neg Hx      I have reviewed and agree with the history as documented  E-Cigarette/Vaping    E-Cigarette Use Current Every Day User      E-Cigarette/Vaping Substances     Social History     Tobacco Use    Smoking status: Current Every Day Smoker     Packs/day: 1 00     Years: 30 00     Pack years: 30 00    Smokeless tobacco: Never Used   Substance Use Topics    Alcohol use: Yes     Alcohol/week: 4 0 - 5 0 standard drinks     Types: 4 - 5 Standard drinks or equivalent per week     Comment: social    Drug use: No       Review of Systems   Constitutional: Negative for chills and fever  HENT: Negative for dental problem and ear pain  Eyes: Negative for pain and redness  Respiratory: Negative for cough and shortness of breath  Cardiovascular: Positive for chest pain  Negative for palpitations  Gastrointestinal: Negative for abdominal pain and nausea  Endocrine: Negative for polydipsia and polyphagia  Genitourinary: Negative for dysuria and frequency  Musculoskeletal: Positive for arthralgias  Negative for joint swelling  Skin: Negative for color change and rash  Neurological: Positive for headaches  Negative for dizziness  Psychiatric/Behavioral: Negative for behavioral problems and confusion  All other systems reviewed and are negative  Physical Exam  Physical Exam  Vitals signs and nursing note reviewed  Constitutional:       General: She is not in acute distress  Appearance: She is well-developed  She is not diaphoretic  HENT:      Head: Atraumatic  Right Ear: External ear normal       Left Ear: External ear normal       Nose: Nose normal    Eyes:      Conjunctiva/sclera: Conjunctivae normal       Pupils: Pupils are equal, round, and reactive to light  Neck:      Musculoskeletal: Normal range of motion and neck supple  Vascular: No JVD  Cardiovascular:      Rate and Rhythm: Normal rate and regular rhythm  Heart sounds: Normal heart sounds  No murmur     Pulmonary:      Effort: Pulmonary effort is normal  No respiratory distress  Breath sounds: Normal breath sounds  No wheezing  Abdominal:      General: Bowel sounds are normal  There is no distension  Palpations: Abdomen is soft  Tenderness: There is no abdominal tenderness  Musculoskeletal: Normal range of motion  Skin:     General: Skin is warm and dry  Capillary Refill: Capillary refill takes less than 2 seconds  Neurological:      Mental Status: She is alert and oriented to person, place, and time  Cranial Nerves: No cranial nerve deficit  Comments: CN II-XII intact grossly, no focal deficits  Normal strength and sensation in b/l upper and lower extremities   Normal FNF and rapid alternating hand movements   Psychiatric:         Behavior: Behavior normal          Vital Signs  ED Triage Vitals   Temperature Pulse Respirations Blood Pressure SpO2   12/31/20 1531 12/31/20 1531 12/31/20 1531 12/31/20 1531 12/31/20 1531   98 2 °F (36 8 °C) (!) 54 18 (!) 186/86 100 %      Temp Source Heart Rate Source Patient Position - Orthostatic VS BP Location FiO2 (%)   12/31/20 1531 12/31/20 1531 12/31/20 1630 12/31/20 1531 --   Oral Monitor Lying Right arm       Pain Score       --                  Vitals:    12/31/20 1730 12/31/20 1800 12/31/20 1845 12/31/20 1900   BP: 132/74 123/60 147/68 111/57   Pulse: (!) 54 (!) 54 59 56   Patient Position - Orthostatic VS: Lying Lying Lying Lying         Visual Acuity  Visual Acuity      Most Recent Value   L Pupil Size (mm)  3   R Pupil Size (mm)  3          ED Medications  Medications   multi-electrolyte (PLASMALYTE-A/ISOLYTE-S PH 7 4) IV solution 1,000 mL (0 mL Intravenous Stopped 12/31/20 1700)   iohexol (OMNIPAQUE) 350 MG/ML injection (SINGLE-DOSE) 100 mL (100 mL Intravenous Given 12/31/20 1838)       Diagnostic Studies  Results Reviewed     Procedure Component Value Units Date/Time    Comprehensive metabolic panel [[id number]]  (Abnormal) Collected: 12/31/20 1730    Lab Status: Final result Specimen: Blood from Arm, Left Updated: 12/31/20 1754     Sodium 139 mmol/L      Potassium 4 4 mmol/L      Chloride 104 mmol/L      CO2 25 mmol/L      ANION GAP 10 mmol/L      BUN 11 mg/dL      Creatinine 0 82 mg/dL      Glucose 89 mg/dL      Calcium 8 5 mg/dL      Corrected Calcium 9 1 mg/dL      AST 14 U/L      ALT 18 U/L      Alkaline Phosphatase 62 U/L      Total Protein 6 8 g/dL      Albumin 3 3 g/dL      Total Bilirubin 0 20 mg/dL      eGFR 81 ml/min/1 73sq m     Narrative:      National Kidney Disease Foundation guidelines for Chronic Kidney Disease (CKD):     Stage 1 with normal or high GFR (GFR > 90 mL/min/1 73 square meters)    Stage 2 Mild CKD (GFR = 60-89 mL/min/1 73 square meters)    Stage 3A Moderate CKD (GFR = 45-59 mL/min/1 73 square meters)    Stage 3B Moderate CKD (GFR = 30-44 mL/min/1 73 square meters)    Stage 4 Severe CKD (GFR = 15-29 mL/min/1 73 square meters)    Stage 5 End Stage CKD (GFR <15 mL/min/1 73 square meters)  Note: GFR calculation is accurate only with a steady state creatinine    Troponin I [[id number]]  (Normal) Collected: 12/31/20 1621    Lab Status: Final result Specimen: Blood from Arm, Right Updated: 12/31/20 1650     Troponin I <0 02 ng/mL     CBC and differential [[id number]]  (Abnormal) Collected: 12/31/20 1621    Lab Status: Final result Specimen: Blood from Arm, Right Updated: 12/31/20 1628     WBC 6 64 Thousand/uL      RBC 5 29 Million/uL      Hemoglobin 14 8 g/dL      Hematocrit 46 5 %      MCV 88 fL      MCH 28 0 pg      MCHC 31 8 g/dL      RDW 14 3 %      MPV 11 3 fL      Platelets 588 Thousands/uL      nRBC 0 /100 WBCs      Neutrophils Relative 49 %      Immat GRANS % 1 %      Lymphocytes Relative 39 %      Monocytes Relative 7 %      Eosinophils Relative 3 %      Basophils Relative 1 %      Neutrophils Absolute 3 33 Thousands/µL      Immature Grans Absolute 0 03 Thousand/uL      Lymphocytes Absolute 2 60 Thousands/µL      Monocytes Absolute 0 43 Thousand/µL      Eosinophils Absolute 0 18 Thousand/µL      Basophils Absolute 0 07 Thousands/µL                  CTA head and neck with and without contrast   Final Result by Antonetta Aschoff, MD (12/31 1919)         1  No evidence of acute intracranial hemorrhage  2  No evidence of hemodynamic significant stenosis, aneurysm or dissection  Workstation performed: [id number]         XR chest 2 views   Final Result by Ketan Wise DO (12/31 1654)      No acute cardiopulmonary disease  Workstation performed: [id number]                    Procedures  ECG 12 Lead Documentation Only    Date/Time: 12/31/2020 4:37 PM  Performed by: Lit Moreno MD  Authorized by: Lit Moreno MD     Comments:      Sinus bradycardia rate of 53 normal axis and intervals no acute ST elevations or depressions             ED Course                             SBIRT 20yo+      Most Recent Value   SBIRT (23 yo +)   In order to provide better care to our patients, we are screening all of our patients for alcohol and drug use  Would it be okay to ask you these screening questions? Yes Filed at: 12/31/2020 1605   Initial Alcohol Screen: US AUDIT-C    1  How often do you have a drink containing alcohol?  0 Filed at: 12/31/2020 1605   2  How many drinks containing alcohol do you have on a typical day you are drinking? 0 Filed at: 12/31/2020 1605   3b  FEMALE Any Age, or MALE 65+: How often do you have 4 or more drinks on one occassion? 0 Filed at: 12/31/2020 1605   Audit-C Score  0 Filed at: 12/31/2020 1605   DENISE: How many times in the past year have you    Used an illegal drug or used a prescription medication for non-medical reasons? Never Filed at: 12/31/2020 1605                    MDM  Patient presents with dizziness, consistent with vertigo  No symptoms while in ED  CTA head/neck negative for central pathology  Trop and EKG negative doubt ACS   Discussed meclizine, ENT, cardiology and pcp follow up  Disposition  Final diagnoses:   Dizziness     Time reflects when diagnosis was documented in both MDM as applicable and the Disposition within this note     Time User Action Codes Description Comment    12/31/2020  7:30 PM Marylu Goldberg Add [R42] Dizziness       ED Disposition     ED Disposition Condition Date/Time Comment    Discharge Stable Thu Dec 31, 2020  7:30 PM Fatou Morrow discharge to home/self care  Follow-up Information     Follow up With Specialties Details Why Contact Info Additional Information    Valentín Silver MD Internal Medicine  As needed [place]  [facility] [address]       Sang Carrero MD Otolaryngology Call  for ENT [address] [facility] [address] [facility]   [facility] [address]  [facility] [address] [id number]  [place] [address] Cardiology [address] [facility] [address] [place], [place], [address] [phone]          Discharge Medication List as of 12/31/2020  7:31 PM      START taking these medications    Details   meclizine (ANTIVERT) 25 mg tablet Take 1 tablet (25 mg total) by mouth 3 (three) times a day as needed for dizziness, Starting Thu 12/31/2020, Print         CONTINUE these medications which have NOT CHANGED    Details   levothyroxine 125 mcg tablet TAKE 1 TABLET BY MOUTH EVERY DAY, Normal      propranolol (INDERAL LA) 120 mg 24 hr capsule Take 1 capsule (120 mg total) by mouth daily, Starting Mon 11/9/2020, Normal      SUMAtriptan (IMITREX) 5 MG/ACT nasal spray 1 spray (5 mg total) into each nostril 2 (two) times a day as needed for migraine, Starting Mon 11/9/2020, Normal           No discharge procedures on file      PDMP Review     None          ED Provider  Electronically Signed by           Marvin Prater MD  12/31/20 9940

## 2021-01-01 NOTE — DISCHARGE INSTRUCTIONS
Take meclizine as prescribed for dizziness  Follow up with ENT and cardiology as well as PCP and return to ED for worsening   Rest and try to reduce stress

## 2021-01-04 ENCOUNTER — VBI (OUTPATIENT)
Dept: ADMINISTRATIVE | Facility: OTHER | Age: 56
End: 2021-01-04

## 2021-01-04 NOTE — TELEPHONE ENCOUNTER
Isaiah Peter    ED Visit Information     Ed visit date: 12-  Diagnosis Description: Dizziness  In Network? Yes [place]  Discharge status: Home  Discharged with meds ? Yes  Number of ED visits to date: 1  ED Severity:3     Outreach Information    Outreach successful: No 2  Date letter mailed:N/A  Date Finalized:1-5-2021    Care Coordination    Follow up appointment with pcp: no    Transportation issues ?  NA    Value Base Outreach    Outreach type: 3 Day Outreach  Emergent necessity warranted by diagnosis: No        01/04/2021 01:41 PM Phone (VBI) Tressa Mayer (Self) [phone] (H) Remove  Left Message - LM asking for call back for ED FU    By Thad Nicole MA    1-4-2020 256pm Destiny gardner       01/05/2021 09:07 AM Phone (VBI) Tressa Mayer (Self) [phone] (H) Remove  Left Message - LM asking for return call for ED FU (Call with me or Call with PCP/ ED FU appt)    By Thad Nicole MA

## 2021-01-05 ENCOUNTER — HOSPITAL ENCOUNTER (OUTPATIENT)
Dept: MAMMOGRAPHY | Facility: CLINIC | Age: 56
Discharge: HOME | End: 2021-01-05
Payer: COMMERCIAL

## 2021-01-05 VITALS — BODY MASS INDEX: 26.68 KG/M2 | WEIGHT: 145 LBS | HEIGHT: 62 IN

## 2021-01-05 DIAGNOSIS — Z12.31 ENCOUNTER FOR SCREENING MAMMOGRAM FOR MALIGNANT NEOPLASM OF BREAST: ICD-10-CM

## 2021-01-05 PROCEDURE — 77067 SCR MAMMO BI INCL CAD: CPT

## 2021-01-05 PROCEDURE — 77063 BREAST TOMOSYNTHESIS BI: CPT

## 2021-04-12 ENCOUNTER — TELEPHONE (OUTPATIENT)
Dept: INTERNAL MEDICINE CLINIC | Facility: CLINIC | Age: 56
End: 2021-04-12

## 2021-04-12 NOTE — TELEPHONE ENCOUNTER
Patient was referred to Dr Sara Garrett for a colonoscopy  Please find out if she went and obtain the report

## 2021-04-18 DIAGNOSIS — E03.9 HYPOTHYROIDISM, ADULT: ICD-10-CM

## 2021-04-18 RX ORDER — LEVOTHYROXINE SODIUM 0.12 MG/1
TABLET ORAL
Qty: 90 TABLET | Refills: 1 | Status: SHIPPED | OUTPATIENT
Start: 2021-04-18 | End: 2021-06-03 | Stop reason: SDUPTHER

## 2021-06-03 ENCOUNTER — TELEPHONE (OUTPATIENT)
Dept: OTHER | Facility: OTHER | Age: 56
End: 2021-06-03

## 2021-06-03 DIAGNOSIS — G43.909 MIGRAINE WITHOUT STATUS MIGRAINOSUS, NOT INTRACTABLE, UNSPECIFIED MIGRAINE TYPE: ICD-10-CM

## 2021-06-03 DIAGNOSIS — E03.9 HYPOTHYROIDISM, ADULT: ICD-10-CM

## 2021-06-03 DIAGNOSIS — I49.3 PVC (PREMATURE VENTRICULAR CONTRACTION): ICD-10-CM

## 2021-06-03 RX ORDER — PROPRANOLOL HYDROCHLORIDE 120 MG/1
CAPSULE, EXTENDED RELEASE ORAL
Qty: 90 CAPSULE | Refills: 1 | Status: SHIPPED | OUTPATIENT
Start: 2021-06-03 | End: 2022-02-14

## 2021-06-03 RX ORDER — LEVOTHYROXINE SODIUM 0.12 MG/1
125 TABLET ORAL DAILY
Qty: 7 TABLET | Refills: 0 | Status: SHIPPED | OUTPATIENT
Start: 2021-06-03 | End: 2021-06-14 | Stop reason: SDUPTHER

## 2021-06-06 DIAGNOSIS — E03.9 HYPOTHYROIDISM, ADULT: ICD-10-CM

## 2021-06-07 RX ORDER — LEVOTHYROXINE SODIUM 0.12 MG/1
TABLET ORAL
Qty: 7 TABLET | Refills: 0 | OUTPATIENT
Start: 2021-06-07

## 2021-06-14 DIAGNOSIS — E03.9 HYPOTHYROIDISM, ADULT: ICD-10-CM

## 2021-06-14 RX ORDER — LEVOTHYROXINE SODIUM 0.12 MG/1
125 TABLET ORAL DAILY
Qty: 30 TABLET | Refills: 5 | Status: SHIPPED | OUTPATIENT
Start: 2021-06-14 | End: 2022-01-13

## 2021-06-14 NOTE — TELEPHONE ENCOUNTER
Levothyroxine 125 mcg tablet-take 1 tablet by mouth daily    [place]    Patient said that she only has two pills left

## 2021-07-02 ENCOUNTER — OFFICE VISIT (OUTPATIENT)
Dept: URGENT CARE | Facility: CLINIC | Age: 56
End: 2021-07-02
Payer: COMMERCIAL

## 2021-07-02 VITALS
WEIGHT: 143 LBS | HEART RATE: 73 BPM | RESPIRATION RATE: 18 BRPM | BODY MASS INDEX: 26.31 KG/M2 | SYSTOLIC BLOOD PRESSURE: 138 MMHG | DIASTOLIC BLOOD PRESSURE: 73 MMHG | TEMPERATURE: 98.5 F | HEIGHT: 62 IN | OXYGEN SATURATION: 100 %

## 2021-07-02 DIAGNOSIS — J01.00 ACUTE NON-RECURRENT MAXILLARY SINUSITIS: Primary | ICD-10-CM

## 2021-07-02 PROCEDURE — 99213 OFFICE O/P EST LOW 20 MIN: CPT | Performed by: PHYSICIAN ASSISTANT

## 2021-07-02 RX ORDER — AZITHROMYCIN 250 MG/1
TABLET, FILM COATED ORAL
Qty: 6 TABLET | Refills: 0 | Status: SHIPPED | OUTPATIENT
Start: 2021-07-02 | End: 2021-07-06

## 2021-07-02 RX ORDER — FLUTICASONE PROPIONATE 50 MCG
1 SPRAY, SUSPENSION (ML) NASAL DAILY
Qty: 16 G | Refills: 0 | Status: SHIPPED | OUTPATIENT
Start: 2021-07-02 | End: 2022-03-23 | Stop reason: ALTCHOICE

## 2021-07-02 RX ORDER — BENZONATATE 100 MG/1
100 CAPSULE ORAL 3 TIMES DAILY PRN
Qty: 30 CAPSULE | Refills: 0 | Status: SHIPPED | OUTPATIENT
Start: 2021-07-02 | End: 2022-02-16 | Stop reason: ALTCHOICE

## 2021-07-02 NOTE — PATIENT INSTRUCTIONS

## 2021-07-02 NOTE — PROGRESS NOTES
[facility]        NAME: Hui Felix is a 54 y o  female  : 1965    MRN: [id number]  DATE: 2021  TIME: 3:22 PM    Assessment and Plan   Acute non-recurrent maxillary sinusitis [J01 00]  1  Acute non-recurrent maxillary sinusitis  azithromycin (ZITHROMAX) 250 mg tablet    fluticasone (FLONASE) 50 mcg/act nasal spray    benzonatate (TESSALON PERLES) 100 mg capsule         Patient Instructions     Patient Instructions     Sinusitis   WHAT YOU NEED TO KNOW:   Sinusitis is inflammation or infection of your sinuses  It is most often caused by a virus  Acute sinusitis may last up to 12 weeks  Chronic sinusitis lasts longer than 12 weeks  Recurrent sinusitis means you have 4 or more times in 1 year  DISCHARGE INSTRUCTIONS:   Return to the emergency department if:   · Your eye and eyelid are red, swollen, and painful  · You cannot open your eye  · You have vision changes, such as double vision  · Your eyeball bulges out or you cannot move your eye  · You are more sleepy than normal, or you notice changes in your ability to think, move, or talk  · You have a stiff neck, a fever, or a bad headache  · You have swelling of your forehead or scalp  Contact your healthcare provider if:   · Your symptoms do not improve after 3 days  · Your symptoms do not go away after 10 days  · You have nausea and are vomiting  · Your nose is bleeding  · You have questions or concerns about your condition or care  Medicines: Your symptoms may go away on their own  Your healthcare provider may recommend watchful waiting for up to 10 days before starting antibiotics  You may  need any of the following:  · Acetaminophen  decreases pain and fever  It is available without a doctor's order  Ask how much to take and how often to take it  Follow directions   Read the labels of all other medicines you are using to see if they also contain acetaminophen, or ask your doctor or pharmacist  Acetaminophen can cause liver damage if not taken correctly  Do not use more than 4 grams (4,000 milligrams) total of acetaminophen in one day  · NSAIDs , such as ibuprofen, help decrease swelling, pain, and fever  This medicine is available with or without a doctor's order  NSAIDs can cause stomach bleeding or kidney problems in certain people  If you take blood thinner medicine, always ask your healthcare provider if NSAIDs are safe for you  Always read the medicine label and follow directions  · Nasal steroid sprays  may help decrease inflammation in your nose and sinuses  · Decongestants  help reduce swelling and drain mucus in the nose and sinuses  They may help you breathe easier  · Antihistamines  help dry mucus in the nose and relieve sneezing  · Antibiotics  help treat or prevent a bacterial infection  · Take your medicine as directed  Contact your healthcare provider if you think your medicine is not helping or if you have side effects  Tell him or her if you are allergic to any medicine  Keep a list of the medicines, vitamins, and herbs you take  Include the amounts, and when and why you take them  Bring the list or the pill bottles to follow-up visits  Carry your medicine list with you in case of an emergency  Self-care:   · Rinse your sinuses  Use a sinus rinse device to rinse your nasal passages with a saline (salt water) solution or distilled water  Do not use tap water  This will help thin the mucus in your nose and rinse away pollen and dirt  It will also help reduce swelling so you can breathe normally  Ask your healthcare provider how often to do this  · Breathe in steam   Heat a bowl of water until you see steam  Lean over the bowl and make a tent over your head with a large towel  Breathe deeply for about 20 minutes  Be careful not to get too close to the steam or burn yourself  Do this 3 times a day   You can also breathe deeply when you take a hot shower  · Sleep with your head elevated  Place an extra pillow under your head before you go to sleep to help your sinuses drain  · Drink liquids as directed  Ask your healthcare provider how much liquid to drink each day and which liquids are best for you  Liquids will thin the mucus in your nose and help it drain  Avoid drinks that contain alcohol or caffeine  · Do not smoke, and avoid secondhand smoke  Nicotine and other chemicals in cigarettes and cigars can make your symptoms worse  Ask your healthcare provider for information if you currently smoke and need help to quit  E-cigarettes or smokeless tobacco still contain nicotine  Talk to your healthcare provider before you use these products  Prevent the spread of germs that cause sinusitis:  Wash your hands often with soap and water  Wash your hands after you use the bathroom, change a child's diaper, or sneeze  Wash your hands before you prepare or eat food  Follow up with your healthcare provider as directed: You may be referred to an ear, nose, and throat specialist  Write down your questions so you remember to ask them during your visits  © Copyright [address] Information is for End User's use only and may not be sold, redistributed or otherwise used for commercial purposes  All illustrations and images included in CareNotes® are the copyrighted property of A D [employer] M [employer]  or [address] [place] [address]  The above information is an  only  It is not intended as medical advice for individual conditions or treatments  Talk to your doctor, nurse or pharmacist before following any medical regimen to see if it is safe and effective for you  Follow up with PCP in 3-5 days  Proceed to  ER if symptoms worsen      Chief Complaint     Chief Complaint   Patient presents with    Cough     pt states she has cough, stuffy nose, sinus pressure and body aches for about two week and getting worse         History of Present Illness       54-year-old female presents with 2 weeks of sinus pressure across her cheeks cough and congestion  She is using ibuprofen over-the-counter  No fever  She had a negative COVID test   No nausea vomiting or diarrhea  No history of sinus surgery  Review of Systems   Review of Systems   Constitutional: Negative for chills, fatigue and fever  HENT: Positive for congestion, sinus pressure, sinus pain and sore throat  Negative for ear pain, postnasal drip, rhinorrhea and trouble swallowing  Eyes: Negative for visual disturbance  Respiratory: Positive for cough  Negative for chest tightness and shortness of breath  Cardiovascular: Negative for chest pain and palpitations  Gastrointestinal: Negative for diarrhea, nausea and vomiting  Neurological: Negative for dizziness           Current Medications       Current Outpatient Medications:     levothyroxine 125 mcg tablet, Take 1 tablet (125 mcg total) by mouth daily, Disp: 30 tablet, Rfl: 5    propranolol (INDERAL LA) 120 mg 24 hr capsule, TAKE 1 CAPSULE BY MOUTH EVERY DAY, Disp: 90 capsule, Rfl: 1    SUMAtriptan (IMITREX) 5 MG/ACT nasal spray, 1 spray (5 mg total) into each nostril 2 (two) times a day as needed for migraine, Disp: 6 Inhaler, Rfl: 5    azithromycin (ZITHROMAX) 250 mg tablet, 2 tabs on day 1, then 1 tab daily for 4 days, Disp: 6 tablet, Rfl: 0    benzonatate (TESSALON PERLES) 100 mg capsule, Take 1 capsule (100 mg total) by mouth 3 (three) times a day as needed for cough, Disp: 30 capsule, Rfl: 0    fluticasone (FLONASE) 50 mcg/act nasal spray, 1 spray into each nostril daily, Disp: 16 g, Rfl: 0    meclizine (ANTIVERT) 25 mg tablet, Take 1 tablet (25 mg total) by mouth 3 (three) times a day as needed for dizziness (Patient not taking: Reported on 7/2/2021), Disp: 30 tablet, Rfl: 0    Current Allergies     Allergies as of 07/02/2021    (No Known Allergies)            The following portions of the patient's history were reviewed and updated as appropriate: allergies, current medications, past family history, past medical history, past social history, past surgical history and problem list      Past Medical History:   Diagnosis Date    Herpes zoster     Thyroid cancer ([address] )        Past Surgical History:   Procedure Laterality Date    THYROIDECTOMY      WRIST FRACTURE SURGERY      Open w hardware       Family History   Problem Relation Age of Onset    Hyperlipidemia Mother     Aortic aneurysm Father     Stroke Father         CVA    Diabetes Father     Kidney cancer Father     Bipolar disorder Sister     Multiple sclerosis Maternal Grandmother     Stroke Maternal Grandfather     Multiple sclerosis Maternal Grandfather     Multiple sclerosis Paternal Grandmother     Other Paternal Grandfather         CVA    Kidney disease Family         Benign hypertensive    Other Family         Carcinosarcoma of the renal pelvis, Headache    Heart disease Family     Hiatal hernia Family     No Known Problems Sister     No Known Problems Half-Sister     No Known Problems Half-Sister     No Known Problems Half-Sister     No Known Problems Half-Sister     Substance Abuse Neg Hx          Medications have been verified  Objective   /73   Pulse 73   Temp 98 5 °F (36 9 °C)   Resp 18   Ht 5' 2" (1 575 m)   Wt 64 9 kg (143 lb)   SpO2 100%   BMI 26 16 kg/m²        Physical Exam     Physical Exam  Constitutional:       General: She is not in acute distress  Appearance: She is well-developed  HENT:      Head: Normocephalic and atraumatic  Right Ear: Tympanic membrane, ear canal and external ear normal  No middle ear effusion  Tympanic membrane is not erythematous, retracted or bulging  Left Ear: Tympanic membrane, ear canal and external ear normal   No middle ear effusion  Tympanic membrane is not erythematous, retracted or bulging  Nose: Congestion present  No mucosal edema or rhinorrhea  Right Sinus: Maxillary sinus tenderness present  No frontal sinus tenderness  Left Sinus: Maxillary sinus tenderness present  No frontal sinus tenderness  Mouth/Throat:      Pharynx: No posterior oropharyngeal erythema  Eyes:      General:         Right eye: No discharge  Left eye: No discharge  Conjunctiva/sclera: Conjunctivae normal       Right eye: Right conjunctiva is not injected  No chemosis  Left eye: Left conjunctiva is not injected  No chemosis  Pupils: Pupils are equal, round, and reactive to light  Cardiovascular:      Rate and Rhythm: Normal rate and regular rhythm  Heart sounds: Normal heart sounds  Pulmonary:      Effort: Pulmonary effort is normal  No respiratory distress  Breath sounds: Normal breath sounds  No wheezing or rales  Musculoskeletal:      Cervical back: Normal range of motion and neck supple  Lymphadenopathy:      Cervical: No cervical adenopathy  Right cervical: No superficial cervical adenopathy  Left cervical: No superficial cervical adenopathy  Skin:     General: Skin is warm and dry  Findings: No rash  Neurological:      Mental Status: She is alert and oriented to person, place, and time  Cranial Nerves: No cranial nerve deficit

## 2021-07-05 DIAGNOSIS — G43.909 MIGRAINE WITHOUT STATUS MIGRAINOSUS, NOT INTRACTABLE, UNSPECIFIED MIGRAINE TYPE: ICD-10-CM

## 2021-07-06 ENCOUNTER — VBI (OUTPATIENT)
Dept: ADMINISTRATIVE | Facility: OTHER | Age: 56
End: 2021-07-06

## 2021-07-06 RX ORDER — SUMATRIPTAN 5 MG/1
1 SPRAY NASAL 2 TIMES DAILY PRN
Qty: 6 EACH | Refills: 5 | Status: SHIPPED | OUTPATIENT
Start: 2021-07-06 | End: 2022-01-16

## 2021-10-26 ENCOUNTER — VBI (OUTPATIENT)
Dept: ADMINISTRATIVE | Facility: OTHER | Age: 56
End: 2021-10-26

## 2022-01-13 DIAGNOSIS — E03.9 HYPOTHYROIDISM, ADULT: ICD-10-CM

## 2022-01-13 RX ORDER — LEVOTHYROXINE SODIUM 0.12 MG/1
TABLET ORAL
Qty: 30 TABLET | Refills: 1 | Status: SHIPPED | OUTPATIENT
Start: 2022-01-13 | End: 2022-02-16 | Stop reason: SDUPTHER

## 2022-02-16 ENCOUNTER — OFFICE VISIT (OUTPATIENT)
Dept: INTERNAL MEDICINE CLINIC | Facility: CLINIC | Age: 57
End: 2022-02-16
Payer: COMMERCIAL

## 2022-02-16 VITALS
SYSTOLIC BLOOD PRESSURE: 116 MMHG | OXYGEN SATURATION: 99 % | DIASTOLIC BLOOD PRESSURE: 68 MMHG | HEART RATE: 72 BPM | HEIGHT: 62 IN | BODY MASS INDEX: 25.91 KG/M2 | TEMPERATURE: 97.4 F | WEIGHT: 140.8 LBS

## 2022-02-16 DIAGNOSIS — E55.9 VITAMIN D DEFICIENCY: ICD-10-CM

## 2022-02-16 DIAGNOSIS — E78.2 MIXED HYPERLIPIDEMIA: ICD-10-CM

## 2022-02-16 DIAGNOSIS — I49.3 PVC (PREMATURE VENTRICULAR CONTRACTION): ICD-10-CM

## 2022-02-16 DIAGNOSIS — R42 VERTIGO: ICD-10-CM

## 2022-02-16 DIAGNOSIS — E03.9 HYPOTHYROIDISM, ADULT: Primary | ICD-10-CM

## 2022-02-16 DIAGNOSIS — G43.909 MIGRAINE WITHOUT STATUS MIGRAINOSUS, NOT INTRACTABLE, UNSPECIFIED MIGRAINE TYPE: ICD-10-CM

## 2022-02-16 DIAGNOSIS — Z12.31 ENCOUNTER FOR SCREENING MAMMOGRAM FOR MALIGNANT NEOPLASM OF BREAST: ICD-10-CM

## 2022-02-16 PROCEDURE — 3725F SCREEN DEPRESSION PERFORMED: CPT | Performed by: PHYSICIAN ASSISTANT

## 2022-02-16 PROCEDURE — 3008F BODY MASS INDEX DOCD: CPT | Performed by: PHYSICIAN ASSISTANT

## 2022-02-16 PROCEDURE — 99214 OFFICE O/P EST MOD 30 MIN: CPT | Performed by: PHYSICIAN ASSISTANT

## 2022-02-16 RX ORDER — PROPRANOLOL HYDROCHLORIDE 120 MG/1
120 CAPSULE, EXTENDED RELEASE ORAL DAILY
Qty: 90 CAPSULE | Refills: 1 | Status: SHIPPED | OUTPATIENT
Start: 2022-02-16 | End: 2022-08-08

## 2022-02-16 RX ORDER — LEVOTHYROXINE SODIUM 0.12 MG/1
125 TABLET ORAL DAILY
Qty: 90 TABLET | Refills: 1 | Status: SHIPPED | OUTPATIENT
Start: 2022-02-16 | End: 2022-08-08

## 2022-02-16 RX ORDER — SUMATRIPTAN 5 MG/1
SPRAY NASAL
Qty: 6 EACH | Refills: 0 | Status: SHIPPED | OUTPATIENT
Start: 2022-02-16 | End: 2022-03-20

## 2022-02-16 NOTE — PATIENT INSTRUCTIONS
No current changes in medications  Have labs done and I will discuss results with you when available  Have mammogram done  Contact GI to set up for colonoscopy  Schedule follow-up in 1 months for annual physical   Follow-up sooner as needed

## 2022-02-16 NOTE — PROGRESS NOTES
Assessment/Plan:   Patient Instructions   No current changes in medications  Have labs done and I will discuss results with you when available  Have mammogram done  Contact GI to set up for colonoscopy  Schedule follow-up in 1 months for annual physical   Follow-up sooner as needed  Quality Measures: BMI Counseling: Body mass index is 25 75 kg/m²  The BMI is above normal  Nutrition recommendations include encouraging healthy choices of fruits and vegetables  Exercise recommendations include exercising 3-5 times per week  Rationale for BMI follow-up plan is due to patient being overweight or obese  Patient was fully clothed when weight was taken  Patient is not obese or overweight  Depression Screening and Follow-up Plan: Patient was screened for depression during today's encounter  They screened negative with a PHQ-2 score of 0  Return in about 4 weeks (around 3/16/2022) for Annual physical          Diagnoses and all orders for this visit:    Hypothyroidism, adult  -     TSH, 3rd generation; Future  -     T4, free; Future  -     levothyroxine 125 mcg tablet; Take 1 tablet (125 mcg total) by mouth daily    Vitamin D deficiency  -     Vitamin D 25 hydroxy; Future    Migraine without status migrainosus, not intractable, unspecified migraine type  -     CBC and differential; Future  -     propranolol (INDERAL LA) 120 mg 24 hr capsule; Take 1 capsule (120 mg total) by mouth daily    Vertigo    Mixed hyperlipidemia  -     Comprehensive metabolic panel; Future  -     Lipid panel; Future    Encounter for screening mammogram for malignant neoplasm of breast  -     Mammo screening bilateral w cad; Future    PVC (premature ventricular contraction)  -     propranolol (INDERAL LA) 120 mg 24 hr capsule; Take 1 capsule (120 mg total) by mouth daily          Subjective:      Patient ID: Leandro Pabon is a 64 y o  female  Follow-up     Patient has not been seen since October of 2020   According to the patient she had intermittently been in [place] helping to care for a terminally ill father  Hypothyroid:  On replacement  Asymptomatic  Will need check labs  Vitamin-D deficient:  Has not been taking vitamin-D supplementation  Will recheck vitamin-D level  History of migraines:  Controlled well with use of propranolol, and Imitrex nasal spray  Hyperlipidemia:  Will check labs  Currently not on any medication  Has previously refused medication  Intermittent vertigo symptoms:  Occurring when turning to the right, or looking to the right  Not persistent  No visual disturbance  No nausea or vomiting  Did occur at 1 time when driving that she had pull off the road  PVCs:  Also on propranolol for this which helps to control  In need of doing mammogram and colonoscopy        ALLERGIES:  No Known Allergies    CURRENT MEDICATIONS:    Current Outpatient Medications:     fluticasone (FLONASE) 50 mcg/act nasal spray, 1 spray into each nostril daily, Disp: 16 g, Rfl: 0    levothyroxine 125 mcg tablet, Take 1 tablet (125 mcg total) by mouth daily, Disp: 90 tablet, Rfl: 1    propranolol (INDERAL LA) 120 mg 24 hr capsule, Take 1 capsule (120 mg total) by mouth daily, Disp: 90 capsule, Rfl: 1    SUMAtriptan (IMITREX) 5 MG/ACT nasal spray, USE 1 SPRAY IN EACH NOSTRIL TWICE A DAY AS NEEDED FOR MIGRAINE, Disp: 6 each, Rfl: 0    ACTIVE PROBLEM LIST:  Patient Active Problem List   Diagnosis    Hypothyroidism, adult    Migraine    PVC (premature ventricular contraction)    Vitamin D deficiency       PAST MEDICAL HISTORY:  Past Medical History:   Diagnosis Date    Herpes zoster     Thyroid cancer (HCC)        PAST SURGICAL HISTORY:  Past Surgical History:   Procedure Laterality Date    THYROIDECTOMY      WRIST FRACTURE SURGERY      Open w hardware       FAMILY HISTORY:  Family History   Problem Relation Age of Onset    Hyperlipidemia Mother     Aortic aneurysm Father     Stroke Father         CVA  Diabetes Father     Kidney cancer Father     Bipolar disorder Sister     Multiple sclerosis Maternal Grandmother     Stroke Maternal Grandfather     Multiple sclerosis Maternal Grandfather     Multiple sclerosis Paternal Grandmother     Other Paternal Grandfather         CVA    Kidney disease Family         Benign hypertensive    Other Family         Carcinosarcoma of the renal pelvis, Headache    Heart disease Family     Hiatal hernia Family     No Known Problems Sister     No Known Problems Half-Sister     No Known Problems Half-Sister     No Known Problems Half-Sister     No Known Problems Half-Sister     Substance Abuse Neg Hx        SOCIAL HISTORY:  Social History     Socioeconomic History    Marital status: /Civil Union     Spouse name: Not on file    Number of children: 2    Years of education: Not on file    Highest education level: Not on file   Occupational History    Occupation:    Tobacco Use    Smoking status: Current Every Day Smoker     Packs/day: 1 00     Years: 30 00     Pack years: 30 00    Smokeless tobacco: Never Used   Vaping Use    Vaping Use: Never used   Substance and Sexual Activity    Alcohol use:  Yes     Alcohol/week: 4 0 - 5 0 standard drinks     Types: 4 - 5 Standard drinks or equivalent per week     Comment: social    Drug use: No    Sexual activity: Yes     Partners: Male   Other Topics Concern    Not on file   Social History Narrative        2 children    Exercises regularly-walking, weight training    Activities-Boating    Dental care regularly    Brushes teeth twice daily    Caffeine use    Employed-realtor, part-time      Social Determinants of Health     Financial Resource Strain: Not on file   Food Insecurity: Not on file   Transportation Needs: Not on file   Physical Activity: Not on file   Stress: Not on file   Social Connections: Not on file   Intimate Partner Violence: Not on file   Housing Stability: Not on file       Review of Systems   Constitutional: Negative for activity change, chills, fatigue and fever  HENT: Negative for congestion  Eyes: Negative for discharge  Respiratory: Negative for cough, chest tightness and shortness of breath  Cardiovascular: Negative for chest pain, palpitations and leg swelling  Gastrointestinal: Negative for abdominal pain, blood in stool, constipation, diarrhea, nausea and vomiting  Endocrine: Negative for polydipsia, polyphagia and polyuria  Genitourinary: Negative for difficulty urinating  Occasional minimal urgency incontinence   Musculoskeletal: Negative for arthralgias and myalgias  Skin: Negative for rash  Allergic/Immunologic: Negative for immunocompromised state  Neurological: Positive for dizziness  Negative for syncope, weakness, light-headedness and headaches  Hematological: Negative for adenopathy  Does not bruise/bleed easily  Psychiatric/Behavioral: Negative for dysphoric mood, sleep disturbance and suicidal ideas  The patient is not nervous/anxious  Objective:  Vitals:    02/16/22 1119   BP: 116/68   BP Location: Right arm   Patient Position: Sitting   Cuff Size: Adult   Pulse: 72   Temp: (!) 97 4 °F (36 3 °C)   TempSrc: Tympanic   SpO2: 99%   Weight: 63 9 kg (140 lb 12 8 oz)   Height: 5' 2" (1 575 m)     Body mass index is 25 75 kg/m²  Physical Exam  Vitals and nursing note reviewed  Constitutional:       General: She is not in acute distress  Appearance: She is well-developed  HENT:      Head: Normocephalic and atraumatic  Eyes:      Extraocular Movements: Extraocular movements intact  Pupils: Pupils are equal, round, and reactive to light  Neck:      Thyroid: No thyromegaly  Vascular: No carotid bruit or JVD  Cardiovascular:      Rate and Rhythm: Normal rate and regular rhythm  Heart sounds: Normal heart sounds  Pulmonary:      Effort: Pulmonary effort is normal  No respiratory distress  Breath sounds: Normal breath sounds  Musculoskeletal:      Cervical back: Neck supple  Right lower leg: No edema  Left lower leg: No edema  Lymphadenopathy:      Cervical: No cervical adenopathy  Skin:     General: Skin is warm and dry  Findings: No rash  Neurological:      General: No focal deficit present  Mental Status: She is alert and oriented to person, place, and time  Mental status is at baseline  Psychiatric:         Mood and Affect: Mood normal          Behavior: Behavior normal            RESULTS:    No results found for this or any previous visit (from the past 1008 hour(s))  This note was created with voice recognition software  Phonic, grammatical and spelling errors may be present within the note as a result

## 2022-03-20 DIAGNOSIS — G43.909 MIGRAINE WITHOUT STATUS MIGRAINOSUS, NOT INTRACTABLE, UNSPECIFIED MIGRAINE TYPE: ICD-10-CM

## 2022-03-20 RX ORDER — SUMATRIPTAN 5 MG/1
SPRAY NASAL
Qty: 6 EACH | Refills: 3 | Status: SHIPPED | OUTPATIENT
Start: 2022-03-20

## 2022-03-21 ENCOUNTER — APPOINTMENT (OUTPATIENT)
Dept: LAB | Facility: HOSPITAL | Age: 57
End: 2022-03-21
Payer: COMMERCIAL

## 2022-03-21 DIAGNOSIS — E55.9 VITAMIN D DEFICIENCY: ICD-10-CM

## 2022-03-21 DIAGNOSIS — E78.2 MIXED HYPERLIPIDEMIA: ICD-10-CM

## 2022-03-21 DIAGNOSIS — D64.9 ANEMIA, UNSPECIFIED TYPE: ICD-10-CM

## 2022-03-21 DIAGNOSIS — G43.909 MIGRAINE WITHOUT STATUS MIGRAINOSUS, NOT INTRACTABLE, UNSPECIFIED MIGRAINE TYPE: ICD-10-CM

## 2022-03-21 DIAGNOSIS — E03.9 HYPOTHYROIDISM, ADULT: ICD-10-CM

## 2022-03-21 LAB
25(OH)D3 SERPL-MCNC: 19.8 NG/ML (ref 30–100)
ALBUMIN SERPL BCP-MCNC: 3.6 G/DL (ref 3.5–5)
ALP SERPL-CCNC: 75 U/L (ref 46–116)
ALT SERPL W P-5'-P-CCNC: 30 U/L (ref 12–78)
ANION GAP SERPL CALCULATED.3IONS-SCNC: 8 MMOL/L (ref 4–13)
AST SERPL W P-5'-P-CCNC: 20 U/L (ref 5–45)
BASOPHILS # BLD AUTO: 0.08 THOUSANDS/ΜL (ref 0–0.1)
BASOPHILS NFR BLD AUTO: 1 % (ref 0–1)
BILIRUB SERPL-MCNC: 0.25 MG/DL (ref 0.2–1)
BUN SERPL-MCNC: 13 MG/DL (ref 5–25)
CALCIUM SERPL-MCNC: 9.2 MG/DL (ref 8.3–10.1)
CHLORIDE SERPL-SCNC: 109 MMOL/L (ref 100–108)
CHOLEST SERPL-MCNC: 234 MG/DL
CO2 SERPL-SCNC: 24 MMOL/L (ref 21–32)
CREAT SERPL-MCNC: 0.73 MG/DL (ref 0.6–1.3)
EOSINOPHIL # BLD AUTO: 0.16 THOUSAND/ΜL (ref 0–0.61)
EOSINOPHIL NFR BLD AUTO: 2 % (ref 0–6)
ERYTHROCYTE [DISTWIDTH] IN BLOOD BY AUTOMATED COUNT: 14.8 % (ref 11.6–15.1)
GFR SERPL CREATININE-BSD FRML MDRD: 92 ML/MIN/1.73SQ M
GLUCOSE P FAST SERPL-MCNC: 102 MG/DL (ref 65–99)
HCT VFR BLD AUTO: 30.2 % (ref 34.8–46.1)
HDLC SERPL-MCNC: 57 MG/DL
HGB BLD-MCNC: 9.3 G/DL (ref 11.5–15.4)
IMM GRANULOCYTES # BLD AUTO: 0.02 THOUSAND/UL (ref 0–0.2)
IMM GRANULOCYTES NFR BLD AUTO: 0 % (ref 0–2)
LDLC SERPL CALC-MCNC: 163 MG/DL (ref 0–100)
LYMPHOCYTES # BLD AUTO: 2.88 THOUSANDS/ΜL (ref 0.6–4.47)
LYMPHOCYTES NFR BLD AUTO: 33 % (ref 14–44)
MCH RBC QN AUTO: 23.1 PG (ref 26.8–34.3)
MCHC RBC AUTO-ENTMCNC: 30.8 G/DL (ref 31.4–37.4)
MCV RBC AUTO: 75 FL (ref 82–98)
MONOCYTES # BLD AUTO: 0.71 THOUSAND/ΜL (ref 0.17–1.22)
MONOCYTES NFR BLD AUTO: 8 % (ref 4–12)
NEUTROPHILS # BLD AUTO: 4.78 THOUSANDS/ΜL (ref 1.85–7.62)
NEUTS SEG NFR BLD AUTO: 56 % (ref 43–75)
NONHDLC SERPL-MCNC: 177 MG/DL
NRBC BLD AUTO-RTO: 0 /100 WBCS
PLATELET # BLD AUTO: 233 THOUSANDS/UL (ref 149–390)
PMV BLD AUTO: 11 FL (ref 8.9–12.7)
POTASSIUM SERPL-SCNC: 4.9 MMOL/L (ref 3.5–5.3)
PROT SERPL-MCNC: 7.2 G/DL (ref 6.4–8.2)
RBC # BLD AUTO: 4.02 MILLION/UL (ref 3.81–5.12)
SODIUM SERPL-SCNC: 141 MMOL/L (ref 136–145)
T4 FREE SERPL-MCNC: 1.52 NG/DL (ref 0.76–1.46)
TRIGL SERPL-MCNC: 68 MG/DL
TSH SERPL DL<=0.05 MIU/L-ACNC: 0.59 UIU/ML (ref 0.36–3.74)
WBC # BLD AUTO: 8.63 THOUSAND/UL (ref 4.31–10.16)

## 2022-03-21 PROCEDURE — 36415 COLL VENOUS BLD VENIPUNCTURE: CPT

## 2022-03-21 PROCEDURE — 82728 ASSAY OF FERRITIN: CPT

## 2022-03-21 PROCEDURE — 83550 IRON BINDING TEST: CPT

## 2022-03-21 PROCEDURE — 85025 COMPLETE CBC W/AUTO DIFF WBC: CPT

## 2022-03-21 PROCEDURE — 84443 ASSAY THYROID STIM HORMONE: CPT

## 2022-03-21 PROCEDURE — 82306 VITAMIN D 25 HYDROXY: CPT

## 2022-03-21 PROCEDURE — 84439 ASSAY OF FREE THYROXINE: CPT

## 2022-03-21 PROCEDURE — 80061 LIPID PANEL: CPT

## 2022-03-21 PROCEDURE — 80053 COMPREHEN METABOLIC PANEL: CPT

## 2022-03-21 PROCEDURE — 83540 ASSAY OF IRON: CPT

## 2022-03-22 LAB
FERRITIN SERPL-MCNC: 4 NG/ML (ref 8–388)
IRON SATN MFR SERPL: 4 % (ref 15–50)
IRON SERPL-MCNC: 20 UG/DL (ref 50–170)
TIBC SERPL-MCNC: 489 UG/DL (ref 250–450)

## 2022-03-23 ENCOUNTER — OFFICE VISIT (OUTPATIENT)
Dept: INTERNAL MEDICINE CLINIC | Facility: CLINIC | Age: 57
End: 2022-03-23
Payer: COMMERCIAL

## 2022-03-23 VITALS
HEART RATE: 76 BPM | OXYGEN SATURATION: 97 % | WEIGHT: 141.8 LBS | TEMPERATURE: 96.8 F | DIASTOLIC BLOOD PRESSURE: 62 MMHG | SYSTOLIC BLOOD PRESSURE: 104 MMHG | HEIGHT: 62 IN | RESPIRATION RATE: 16 BRPM | BODY MASS INDEX: 26.09 KG/M2

## 2022-03-23 DIAGNOSIS — D50.9 IRON DEFICIENCY ANEMIA, UNSPECIFIED IRON DEFICIENCY ANEMIA TYPE: ICD-10-CM

## 2022-03-23 DIAGNOSIS — Z00.00 ANNUAL PHYSICAL EXAM: Primary | ICD-10-CM

## 2022-03-23 DIAGNOSIS — E55.9 VITAMIN D DEFICIENCY: ICD-10-CM

## 2022-03-23 DIAGNOSIS — R73.01 IMPAIRED FASTING GLUCOSE: ICD-10-CM

## 2022-03-23 DIAGNOSIS — Z00.00 HEALTHCARE MAINTENANCE: ICD-10-CM

## 2022-03-23 DIAGNOSIS — E03.9 HYPOTHYROIDISM, ADULT: ICD-10-CM

## 2022-03-23 DIAGNOSIS — F50.89 PICA: ICD-10-CM

## 2022-03-23 LAB — SL AMB POCT HEMOGLOBIN AIC: 5.5 (ref ?–6.5)

## 2022-03-23 PROCEDURE — 3008F BODY MASS INDEX DOCD: CPT | Performed by: PHYSICIAN ASSISTANT

## 2022-03-23 PROCEDURE — 4004F PT TOBACCO SCREEN RCVD TLK: CPT | Performed by: PHYSICIAN ASSISTANT

## 2022-03-23 PROCEDURE — 83036 HEMOGLOBIN GLYCOSYLATED A1C: CPT | Performed by: PHYSICIAN ASSISTANT

## 2022-03-23 PROCEDURE — 99396 PREV VISIT EST AGE 40-64: CPT | Performed by: PHYSICIAN ASSISTANT

## 2022-03-23 RX ORDER — FERROUS GLUCONATE 324(37.5)
324 TABLET ORAL
Qty: 60 TABLET | Refills: 5 | Status: SHIPPED | OUTPATIENT
Start: 2022-03-23 | End: 2022-07-12 | Stop reason: ALTCHOICE

## 2022-03-23 NOTE — PATIENT INSTRUCTIONS
I will be in touch with your colorectal surgeon to discuss  Your case  Please try to remember to take your supplemental vitamin-D 3 5000 units daily  Continue your other medications  Will have you schedule follow-up in 3 months, sooner as needed

## 2022-03-23 NOTE — PROGRESS NOTES
Assessment/Plan:   Patient Instructions   I will be in touch with your colorectal surgeon to discuss  Your case  Please try to remember to take your supplemental vitamin-D 3 5000 units daily  Continue your other medications  Will have you schedule follow-up in 3 months, sooner as needed  Patient to start MiraLax 1 capful twice daily  Also start ferrous gluconate up to 3 times daily  Colorectal specialist will be in touch with her to move her appointment up to earlier next week  Quality Measures: Tobacco Cessation Counseling: Tobacco cessation counseling was provided  The patient is sincerely urged to quit consumption of tobacco  She is ready to quit tobacco         Return in about 3 months (around 6/23/2022) for Next scheduled follow up  Diagnoses and all orders for this visit:    Annual physical exam    Hypothyroidism, adult    Vitamin D deficiency    Iron deficiency anemia, unspecified iron deficiency anemia type    Pica    Impaired fasting glucose  -     POCT hemoglobin A1c          Subjective:      Patient ID: Sugey Drivers is a 64 y o  female  51-year-old female presents for a routine physical   She was seen last month for routine follow-up however had not been in since 2020  Labs were obtained showing the patient to be significantly anemic from labs that were done in December of 2020  She had significant drop in her H&H to 9 3 and 30 2  Iron studies were done showing significant iron deficiency and low ferritin  On questioning patient states she has long history of constipation, forcing her bowel movements, hemorrhoids that have been intermittently bleeding  She denies her stools to be black  No complaint of GERD symptoms  She does admit to PICA, lightheadedness, occasional palpitations and shortness of breath  Patient has made appointment with her GI specialist     Hypothyroidism: On replacement  Slight elevation in T4 with low normal TSH  Will repeat in near future    She has been on the same dose of levothyroxine for years  Vitamin-D deficiency:  D level at 19 8  Has not been taking her for supplemental vitamin-D regularly  She will start doing so  She does admit to myalgias and muscle twitching  Impaired fasting glucose:  , A1c checked in the office was 5 5  Denies polyuria, polyphagia, polydipsia  Having mammogram done next week  Will need referral to Gynecology as has not been seen in many years        ALLERGIES:  No Known Allergies    CURRENT MEDICATIONS:    Current Outpatient Medications:     levothyroxine 125 mcg tablet, Take 1 tablet (125 mcg total) by mouth daily, Disp: 90 tablet, Rfl: 1    propranolol (INDERAL LA) 120 mg 24 hr capsule, Take 1 capsule (120 mg total) by mouth daily, Disp: 90 capsule, Rfl: 1    SUMAtriptan (IMITREX) 5 MG/ACT nasal spray, USE 1 SPRAY IN EACH NOSTRIL TWICE A DAY AS NEEDED FOR MIGRAINE, Disp: 6 each, Rfl: 3    ACTIVE PROBLEM LIST:  Patient Active Problem List   Diagnosis    Hypothyroidism, adult    Migraine    PVC (premature ventricular contraction)    Vitamin D deficiency       PAST MEDICAL HISTORY:  Past Medical History:   Diagnosis Date    Disease of thyroid gland     Herpes zoster     Thyroid cancer ([facility] [address] )        PAST SURGICAL HISTORY:  Past Surgical History:   Procedure Laterality Date    THYROIDECTOMY      WRIST FRACTURE SURGERY      Open w hardware       FAMILY HISTORY:  Family History   Problem Relation Age of Onset    Hyperlipidemia Mother     Aortic aneurysm Father     Stroke Father         CVA    Diabetes Father     Kidney cancer Father     Bipolar disorder Sister     Hyperlipidemia Sister     Multiple sclerosis Maternal Grandmother     Stroke Maternal Grandfather     Multiple sclerosis Maternal Grandfather     Multiple sclerosis Paternal Grandmother     Other Paternal Grandfather         CVA    Kidney disease Family         Benign hypertensive    Other Family         Carcinosarcoma of the renal pelvis, Headache    Heart disease Family     Hiatal hernia Family     No Known Problems Sister     No Known Problems Half-Sister     No Known Problems Half-Sister     No Known Problems Half-Sister     No Known Problems Half-Sister     Substance Abuse Neg Hx        SOCIAL HISTORY:  Social History     Socioeconomic History    Marital status: /Civil Union     Spouse name: Not on file    Number of children: 2    Years of education: Not on file    Highest education level: Not on file   Occupational History    Occupation:    Tobacco Use    Smoking status: Current Every Day Smoker     Packs/day: 1 00     Years: 30 00     Pack years: 30 00    Smokeless tobacco: Never Used   Vaping Use    Vaping Use: Never used   Substance and Sexual Activity    Alcohol use: Yes     Alcohol/week: 4 0 - 5 0 standard drinks     Types: 4 - 5 Standard drinks or equivalent per week     Comment: social    Drug use: No    Sexual activity: Yes     Partners: Male   Other Topics Concern    Not on file   Social History Narrative        2 children    Exercises regularly-walking, [employer] training    Activities-Boating    Dental care regularly    Brushes teeth twice daily    Caffeine use    Employed-realtor, part-time      Social Determinants of Health     Financial Resource Strain: Not on file   Food Insecurity: Not on file   Transportation Needs: Not on file   Physical Activity: Not on file   Stress: Not on file   Social Connections: Not on file   Intimate Partner Violence: Not on file   Housing Stability: Not on file       Review of Systems   Constitutional: Positive for fatigue  Negative for activity change, chills and fever  HENT: Negative for congestion  Eyes: Negative for discharge  Respiratory: Positive for shortness of breath  Negative for cough and chest tightness  Cardiovascular: Positive for palpitations  Negative for chest pain and leg swelling     Gastrointestinal: Positive for anal bleeding and constipation  Negative for abdominal pain, blood in stool, diarrhea, nausea and vomiting  Endocrine: Negative for polydipsia, polyphagia and polyuria  Genitourinary: Negative for difficulty urinating  Musculoskeletal: Negative for arthralgias and myalgias  Skin: Negative for rash  Allergic/Immunologic: Negative for immunocompromised state  Neurological: Positive for light-headedness and headaches  Negative for dizziness, syncope and weakness  Hematological: Negative for adenopathy  Does not bruise/bleed easily  Psychiatric/Behavioral: Negative for dysphoric mood, sleep disturbance and suicidal ideas  The patient is not nervous/anxious  Objective:  Vitals:    03/23/22 1600   BP: 104/62   BP Location: Left arm   Patient Position: Sitting   Cuff Size: Adult   Pulse: 76   Resp: 16   Temp: (!) 96 8 °F (36 °C)   TempSrc: Tympanic   SpO2: 97%   Weight: 64 3 kg (141 lb 12 8 oz)   Height: 5' 2" (1 575 m)     Body mass index is 25 94 kg/m²  Physical Exam  Vitals and nursing note reviewed  Constitutional:       General: She is not in acute distress  Appearance: She is well-developed  She is not ill-appearing  Comments:   Pale appearing   HENT:      Head: Normocephalic and atraumatic  Right Ear: Tympanic membrane, ear canal and external ear normal       Left Ear: Tympanic membrane, ear canal and external ear normal       Nose: Nose normal       Mouth/Throat:      Mouth: Mucous membranes are moist       Pharynx: Oropharynx is clear  Comments:  Pale mucous membranes  Eyes:      General: No scleral icterus  Right eye: No discharge  Left eye: No discharge  Extraocular Movements: Extraocular movements intact  Conjunctiva/sclera: Conjunctivae normal       Pupils: Pupils are equal, round, and reactive to light  Comments:  Pale conjunctiva   Neck:      Thyroid: No thyromegaly  Vascular: No carotid bruit     Cardiovascular:      Rate and Rhythm: Normal rate and regular rhythm  Pulses: Normal pulses  Heart sounds: Normal heart sounds  No murmur heard  Pulmonary:      Effort: Pulmonary effort is normal  No respiratory distress  Breath sounds: Normal breath sounds  Chest:      Chest wall: No tenderness  Abdominal:      General: Abdomen is flat  Bowel sounds are normal  There is no distension  Palpations: Abdomen is soft  There is no hepatomegaly, splenomegaly or mass  Tenderness: There is no abdominal tenderness  There is no right CVA tenderness, left CVA tenderness, guarding or rebound  Musculoskeletal:         General: No swelling  Normal range of motion  Cervical back: Normal range of motion and neck supple  Right lower leg: No edema  Left lower leg: No edema  Lymphadenopathy:      Cervical: No cervical adenopathy  Skin:     General: Skin is warm and dry  Coloration: Skin is pale  Findings: No rash  Neurological:      General: No focal deficit present  Mental Status: She is alert and oriented to person, place, and time  Cranial Nerves: No cranial nerve deficit  Sensory: No sensory deficit  Motor: No weakness  Coordination: Coordination normal       Gait: Gait normal       Deep Tendon Reflexes: Reflexes are normal and symmetric  Reflexes normal    Psychiatric:         Mood and Affect: Mood normal          Behavior: Behavior normal          Thought Content:  Thought content normal          Judgment: Judgment normal            RESULTS:    Recent Results (from the past 1008 hour(s))   CBC and differential    Collection Time: 03/21/22  2:33 PM   Result Value Ref Range    WBC 8 63 4 31 - 10 16 Thousand/uL    RBC 4 02 3 81 - 5 12 Million/uL    Hemoglobin 9 3 (L) 11 5 - 15 4 g/dL    Hematocrit 30 2 (L) 34 8 - 46 1 %    MCV 75 (L) 82 - 98 fL    MCH 23 1 (L) 26 8 - 34 3 pg    MCHC 30 8 (L) 31 4 - 37 4 g/dL    RDW 14 8 11 6 - 15 1 %    MPV 11 0 8 9 - 12 7 fL    Platelets 233 149 - 390 Thousands/uL    nRBC 0 /100 WBCs    Neutrophils Relative 56 43 - 75 %    Immat GRANS % 0 0 - 2 %    Lymphocytes Relative 33 14 - 44 %    Monocytes Relative 8 4 - 12 %    Eosinophils Relative 2 0 - 6 %    Basophils Relative 1 0 - 1 %    Neutrophils Absolute 4 78 1 85 - 7 62 Thousands/µL    Immature Grans Absolute 0 02 0 00 - 0 20 Thousand/uL    Lymphocytes Absolute 2 88 0 60 - 4 47 Thousands/µL    Monocytes Absolute 0 71 0 17 - 1 22 Thousand/µL    Eosinophils Absolute 0 16 0 00 - 0 61 Thousand/µL    Basophils Absolute 0 08 0 00 - 0 10 Thousands/µL   Comprehensive metabolic panel    Collection Time: 03/21/22  2:33 PM   Result Value Ref Range    Sodium 141 136 - 145 mmol/L    Potassium 4 9 3 5 - 5 3 mmol/L    Chloride 109 (H) 100 - 108 mmol/L    CO2 24 21 - 32 mmol/L    ANION GAP 8 4 - 13 mmol/L    BUN 13 5 - 25 mg/dL    Creatinine 0 73 0 60 - 1 30 mg/dL    Glucose, Fasting 102 (H) 65 - 99 mg/dL    Calcium 9 2 8 3 - 10 1 mg/dL    AST 20 5 - 45 U/L    ALT 30 12 - 78 U/L    Alkaline Phosphatase 75 46 - 116 U/L    Total Protein 7 2 6 4 - 8 2 g/dL    Albumin 3 6 3 5 - 5 0 g/dL    Total Bilirubin 0 25 0 20 - 1 00 mg/dL    eGFR 92 ml/min/1 73sq m   Lipid panel    Collection Time: 03/21/22  2:33 PM   Result Value Ref Range    Cholesterol 234 (H) See Comment mg/dL    Triglycerides 68 See Comment mg/dL    HDL, Direct 57 >=50 mg/dL    LDL Calculated 163 (H) 0 - 100 mg/dL    Non-HDL-Chol (CHOL-HDL) 177 mg/dl   TSH, 3rd generation    Collection Time: 03/21/22  2:33 PM   Result Value Ref Range    TSH 3RD GENERATON 0 587 0 358 - 3 740 uIU/mL   T4, free    Collection Time: 03/21/22  2:33 PM   Result Value Ref Range    Free T4 1 52 (H) 0 76 - 1 46 ng/dL   Vitamin D 25 hydroxy    Collection Time: 03/21/22  2:33 PM   Result Value Ref Range    Vit D, 25-Hydroxy 19 8 (L) 30 0 - 100 0 ng/mL   Iron Saturation %    Collection Time: 03/21/22  2:33 PM   Result Value Ref Range    Iron Saturation 4 (L) 15 - 50 %    TIBC 489 (H) 250 - 450 ug/dL    Iron 20 (L) 50 - 170 ug/dL   Ferritin    Collection Time: 03/21/22  2:33 PM   Result Value Ref Range    Ferritin 4 (L) 8 - 388 ng/mL   POCT hemoglobin A1c    Collection Time: 03/23/22  4:34 PM   Result Value Ref Range    Hemoglobin A1C 5 5 6 5       This note was created with voice recognition software  Phonic, grammatical and spelling errors may be present within the note as a result

## 2022-03-28 ENCOUNTER — HOSPITAL ENCOUNTER (OUTPATIENT)
Dept: MAMMOGRAPHY | Facility: CLINIC | Age: 57
Discharge: HOME | End: 2022-03-28
Payer: COMMERCIAL

## 2022-03-28 VITALS — BODY MASS INDEX: 25.95 KG/M2 | WEIGHT: 141 LBS | HEIGHT: 62 IN

## 2022-03-28 DIAGNOSIS — Z12.31 ENCOUNTER FOR SCREENING MAMMOGRAM FOR MALIGNANT NEOPLASM OF BREAST: ICD-10-CM

## 2022-03-28 PROCEDURE — 77063 BREAST TOMOSYNTHESIS BI: CPT

## 2022-03-28 PROCEDURE — 77067 SCR MAMMO BI INCL CAD: CPT

## 2022-03-31 ENCOUNTER — TELEPHONE (OUTPATIENT)
Dept: GASTROENTEROLOGY | Facility: CLINIC | Age: 57
End: 2022-03-31

## 2022-04-11 ENCOUNTER — TELEPHONE (OUTPATIENT)
Dept: GASTROENTEROLOGY | Facility: CLINIC | Age: 57
End: 2022-04-11

## 2022-04-11 ENCOUNTER — LAB REQUISITION (OUTPATIENT)
Dept: LAB | Facility: HOSPITAL | Age: 57
End: 2022-04-11
Payer: COMMERCIAL

## 2022-04-11 DIAGNOSIS — K90.9 INTESTINAL MALABSORPTION, UNSPECIFIED: ICD-10-CM

## 2022-04-11 DIAGNOSIS — D50.9 IRON DEFICIENCY ANEMIA, UNSPECIFIED: ICD-10-CM

## 2022-04-11 PROCEDURE — 88305 TISSUE EXAM BY PATHOLOGIST: CPT | Performed by: SPECIALIST

## 2022-06-09 ENCOUNTER — TELEPHONE (OUTPATIENT)
Dept: INTERNAL MEDICINE CLINIC | Facility: CLINIC | Age: 57
End: 2022-06-09

## 2022-06-09 NOTE — PROGRESS NOTES
[address] [place] [address]     Reason for documentation: Patient was flagged by Third Party Payer and/or internal report as being on high-cost medication, SUMATRIPTAN NASAL SPRAY which has a lower cost therapeutic interchange of  SUMATRIPTAN SUCCINATE Tab  Attempt #1- Left message for pt to return call to discuss         Pharmacist Tracking Tool    Reason For [address] Pharmacist    Demographics  Interaction Method: Phone  Type of Intervention: New    Topic(s) Addressed  High-Cost/Low-Value    Intervention(s) Made    Pharmacologic:    -- Medication Conversion - Non-Preferred to Preferred    Tool(s) Used  Payer Portal    Time Spent:     Time Spent in Care Coordination: 10 minutes    Recommendations  Recipient: Patient/caregiver  Outcome: Pending/Follow-up Required

## 2022-06-22 ENCOUNTER — TELEMEDICINE (OUTPATIENT)
Dept: INTERNAL MEDICINE CLINIC | Facility: CLINIC | Age: 57
End: 2022-06-22
Payer: COMMERCIAL

## 2022-06-22 DIAGNOSIS — R11.0 NAUSEA: ICD-10-CM

## 2022-06-22 DIAGNOSIS — R52 BODY ACHES: ICD-10-CM

## 2022-06-22 DIAGNOSIS — R51.9 NONINTRACTABLE HEADACHE, UNSPECIFIED CHRONICITY PATTERN, UNSPECIFIED HEADACHE TYPE: Primary | ICD-10-CM

## 2022-06-22 DIAGNOSIS — R50.9 FEVER, UNSPECIFIED FEVER CAUSE: ICD-10-CM

## 2022-06-22 DIAGNOSIS — R53.81 MALAISE: ICD-10-CM

## 2022-06-22 PROCEDURE — U0003 INFECTIOUS AGENT DETECTION BY NUCLEIC ACID (DNA OR RNA); SEVERE ACUTE RESPIRATORY SYNDROME CORONAVIRUS 2 (SARS-COV-2) (CORONAVIRUS DISEASE [COVID-19]), AMPLIFIED PROBE TECHNIQUE, MAKING USE OF HIGH THROUGHPUT TECHNOLOGIES AS DESCRIBED BY CMS-2020-01-R: HCPCS | Performed by: PHYSICIAN ASSISTANT

## 2022-06-22 PROCEDURE — 4004F PT TOBACCO SCREEN RCVD TLK: CPT | Performed by: PHYSICIAN ASSISTANT

## 2022-06-22 PROCEDURE — 99213 OFFICE O/P EST LOW 20 MIN: CPT | Performed by: PHYSICIAN ASSISTANT

## 2022-06-22 PROCEDURE — U0005 INFEC AGEN DETEC AMPLI PROBE: HCPCS | Performed by: PHYSICIAN ASSISTANT

## 2022-06-22 NOTE — PROGRESS NOTES
COVID-19 Outpatient Progress Note    Assessment/Plan:  Patient Instructions   Come to office for COVID-19 testing  After we obtain results we will discuss and discuss further treatment  Rest, increase fluids  Tylenol for fever or aches as per package instructions  Problem List Items Addressed This Visit    None     Visit Diagnoses     Nonintractable headache, unspecified chronicity pattern, unspecified headache type    -  Primary    Relevant Orders    COVID Only - Office Collect    Fever, unspecified fever cause        Relevant Orders    COVID Only - Office Collect    Body aches        Relevant Orders    COVID Only - Office Collect    Nausea        Relevant Orders    COVID Only - Office Collect    Malaise        Relevant Orders    COVID Only - Office Collect         Disposition:     Recommended patient to come to the office to test for COVID-19  Discussed vitamin D, vitamin C, and/or zinc supplementation with patient  Patient started today with headache, fever, chills, body aches, nausea  Four days ago had been around several different groups  Was not aware that anyone was ill  Currently can smell  Currently can taste  Tired  I have spent 8 minutes directly with the patient  Greater than 50% of this time was spent in counseling/coordination of care regarding: prognosis, instructions for management, importance of treatment compliance, risk factor reductions and impressions  Encounter provider Kane Magaña PA-C    Provider located at [address] [place] [address]  [facility] [address]  [address] [facility] [address] [phone]    Recent Visits  No visits were found meeting these conditions    Showing recent visits within past 7 days and meeting all other requirements  Today's Visits  Date Type Provider Dept   06/22/22 Telemedicine Kane Magaña PA-C  [facility]   Showing today's visits and meeting all other requirements  Future Appointments  No visits were found meeting these conditions  Showing future appointments within next 150 days and meeting all other requirements     This virtual check-in was done via [address] and patient was informed that this is a secure, HIPAA-compliant platform  She agrees to proceed  Patient agrees to participate in a virtual check in via telephone or video visit instead of presenting to the office to address urgent/immediate medical needs  Patient is aware this is a billable service  After connecting through [facility], the patient was identified by name and date of birth  Migdalia Cabrera was informed that this was a telemedicine visit and that the exam was being conducted confidentially over secure lines  My office door was closed  No one else was in the room  Migdalia Cabrera acknowledged consent and understanding of privacy and security of the telemedicine visit  I informed the patient that I have reviewed her record in Epic and presented the opportunity for her to ask any questions regarding the visit today  The patient agreed to participate  Verification of patient location:  Patient is located in the following state in which I hold an active license: [place]    Subjective:   Migdalia Cabrera is a 64 y o  female who is concerned about COVID-19  Patient's symptoms include fever, chills, fatigue, malaise, nausea, diarrhea, myalgias and headache   Patient denies congestion, rhinorrhea, sore throat, anosmia, loss of taste, cough, shortness of breath, chest tightness, abdominal pain and vomiting      - Date of symptom onset: 6/21/2022  - Date of exposure: 6/18/2022      COVID-19 vaccination status: Not vaccinated    Exposure:   Contact with a person who is under investigation (PUI) for or who is positive for COVID-19 within the last 14 days?: No    Hospitalized recently for fever and/or lower respiratory symptoms?: No      Currently a healthcare worker that is involved in direct patient care?: No      Works in a special setting where the risk of COVID-19 transmission may be high? (this may include long-term care, correctional and [facility] facilities; homeless shelters; assisted-living facilities and group homes ): No      Resident in a special setting where the risk of COVID-19 transmission may be high? (this may include long-term care, correctional and [facility] facilities; homeless shelters; assisted-living facilities and group homes ): No      No results found for: SARSCOV2, [facility], SARSCORONAVI, CORONAVIRUSR, SARSCOVAG, [facility]  Past Medical History:   Diagnosis Date    Disease of thyroid gland     Herpes zoster     Thyroid cancer ([facility] [address] )      Past Surgical History:   Procedure Laterality Date    THYROIDECTOMY      WRIST FRACTURE SURGERY      Open w hardware     Current Outpatient Medications   Medication Sig Dispense Refill    ferrous gluconate 324 (37 5 Fe) mg Take 1 tablet (324 mg total) by mouth 2 (two) times a day before meals 60 tablet 5    levothyroxine 125 mcg tablet Take 1 tablet (125 mcg total) by mouth daily 90 tablet 1    propranolol (INDERAL LA) 120 mg 24 hr capsule Take 1 capsule (120 mg total) by mouth daily 90 capsule 1    SUMAtriptan (IMITREX) 5 MG/ACT nasal spray USE 1 SPRAY IN EACH NOSTRIL TWICE A DAY AS NEEDED FOR MIGRAINE 6 each 3     No current facility-administered medications for this visit  No Known Allergies    Review of Systems   Constitutional: Positive for chills, fatigue and fever  HENT: Negative for congestion, rhinorrhea and sore throat  Respiratory: Negative for cough, chest tightness and shortness of breath  Gastrointestinal: Positive for diarrhea and nausea  Negative for abdominal pain and vomiting  Musculoskeletal: Positive for myalgias  Neurological: Positive for headaches  Objective: There were no vitals filed for this visit  Physical Exam  Constitutional:       General: She is not in acute distress       Appearance: She is well-developed  HENT:      Head: Normocephalic  Pulmonary:      Effort: Pulmonary effort is normal  No respiratory distress  Neurological:      Mental Status: She is alert and oriented to person, place, and time  Psychiatric:         Behavior: Behavior normal          VIRTUAL VISIT 550 Samir Chandana verbally agrees to participate in [employer]  Pt is aware that [employer] could be limited without vital signs or the ability to perform a full hands-on physical Brianatara Og understands she or the provider may request at any time to terminate the video visit and request the patient to seek care or treatment in person

## 2022-06-22 NOTE — PATIENT INSTRUCTIONS
Come to office for COVID-19 testing  After we obtain results we will discuss and discuss further treatment  Rest, increase fluids  Tylenol for fever or aches as per package instructions

## 2022-06-23 ENCOUNTER — TELEPHONE (OUTPATIENT)
Dept: INTERNAL MEDICINE CLINIC | Facility: CLINIC | Age: 57
End: 2022-06-23

## 2022-06-23 DIAGNOSIS — U07.1 COVID-19 VIRUS INFECTION: Primary | ICD-10-CM

## 2022-06-23 LAB — SARS-COV-2 RNA RESP QL NAA+PROBE: POSITIVE

## 2022-06-23 NOTE — TELEPHONE ENCOUNTER
----- Message from John White PA-C sent at 6/23/2022 10:46 AM EDT -----   Please inform patient that her COVID testing is positive  We could offer her Paxlovid antibody treatment if she would like

## 2022-07-12 ENCOUNTER — OFFICE VISIT (OUTPATIENT)
Dept: OBGYN CLINIC | Facility: CLINIC | Age: 57
End: 2022-07-12
Payer: COMMERCIAL

## 2022-07-12 VITALS
BODY MASS INDEX: 25.21 KG/M2 | HEIGHT: 62 IN | SYSTOLIC BLOOD PRESSURE: 126 MMHG | DIASTOLIC BLOOD PRESSURE: 74 MMHG | WEIGHT: 137 LBS

## 2022-07-12 DIAGNOSIS — N94.10 FEMALE DYSPAREUNIA: ICD-10-CM

## 2022-07-12 DIAGNOSIS — Z01.419 ENCOUNTER FOR GYNECOLOGICAL EXAMINATION WITHOUT ABNORMAL FINDING: Primary | ICD-10-CM

## 2022-07-12 DIAGNOSIS — Z00.00 HEALTHCARE MAINTENANCE: ICD-10-CM

## 2022-07-12 DIAGNOSIS — Z12.31 ENCOUNTER FOR SCREENING MAMMOGRAM FOR MALIGNANT NEOPLASM OF BREAST: ICD-10-CM

## 2022-07-12 PROBLEM — F17.200 TOBACCO USE DISORDER: Status: ACTIVE | Noted: 2021-07-13

## 2022-07-12 PROBLEM — S52.509A FRACTURE OF DISTAL END OF RADIUS: Status: ACTIVE | Noted: 2021-07-13

## 2022-07-12 PROCEDURE — S0610 ANNUAL GYNECOLOGICAL EXAMINA: HCPCS | Performed by: OBSTETRICS & GYNECOLOGY

## 2022-07-12 PROCEDURE — G0476 HPV COMBO ASSAY CA SCREEN: HCPCS | Performed by: OBSTETRICS & GYNECOLOGY

## 2022-07-12 PROCEDURE — G0145 SCR C/V CYTO,THINLAYER,RESCR: HCPCS | Performed by: OBSTETRICS & GYNECOLOGY

## 2022-07-12 RX ORDER — FERROUS GLUCONATE 270(27)MG
1 TABLET ORAL 2 TIMES DAILY WITH MEALS
COMMUNITY
Start: 2022-06-17

## 2022-07-12 RX ORDER — ESTRADIOL 0.1 MG/G
0.5 CREAM VAGINAL 2 TIMES WEEKLY
Qty: 42.5 G | Refills: 3 | Status: SHIPPED | OUTPATIENT
Start: 2022-07-14

## 2022-07-12 NOTE — PROGRESS NOTES
Assessment/Plan:    Encounter for gynecological examination without abnormal finding  Pap/HPV collected  Mammogram ordered  Colonoscopy current    Vulvovaginal atrophy - otc lubricants vs vaginal estrogen discussed  Safe and effective use of estrogen discussed  Encourage healthy diet, exercise, Calcium 1200mg per day and at least 800 iu Vitamin D daily  Diagnoses and all orders for this visit:    Encounter for gynecological examination without abnormal finding  -     Liquid-based pap, screening    Healthcare maintenance  -     Ambulatory referral to Gynecology    Encounter for screening mammogram for malignant neoplasm of breast  -     Mammo screening bilateral w 3d & cad; Future    Female dyspareunia  -     estradiol (ESTRACE) 0 1 mg/g vaginal cream; Insert 0 5 g into the vagina 2 (two) times a week    Other orders  -     Fergon 240 (27 Fe) MG tablet; Take 1 tablet by mouth 2 (two) times a day with meals          Subjective:      Patient ID: Jairo Mcneal is a 64 y o  female  Here today for her annual Exam  Current complaints hot flashes, pain and burning with intercourse    Menarche 15    since early 45s   Last PAP 2017    Last Mammo 3/28/22  Last Colonoscopy 22    Smoking Status 1 -1 5ppd  Drinking Status social   Recreational Drug Use never  Exercise Routine  Nothing formal      Painful intercourse  Very busy life - minimal eating  Trouble sleeping  Was diagnosed as anemic recently  Had GI evaluation  Gynecologic Exam  She reports no genital itching, genital lesions, genital odor, genital rash, pelvic pain, vaginal bleeding or vaginal discharge  The patient is experiencing no pain  Associated symptoms include painful intercourse  Pertinent negatives include no chills, constipation, diarrhea, dysuria, fever, frequency, nausea, rash, sore throat, urgency or vomiting  She is sexually active         The following portions of the patient's history were reviewed and updated as appropriate:   She  has a past medical history of Disease of thyroid gland and Migraine  She   Patient Active Problem List    Diagnosis Date Noted    Encounter for gynecological examination without abnormal finding 07/12/2022    Fracture of distal end of radius 07/13/2021    Tobacco use disorder 07/13/2021    Vitamin D deficiency 04/06/2020    Hypothyroidism, adult 02/23/2017    Migraine 02/23/2017    PVC (premature ventricular contraction) 02/23/2017     She  has a past surgical history that includes Wrist fracture surgery (Left) and EGD AND COLONOSCOPY  Her family history includes Aortic aneurysm in her father; Heart disease in her family; Hiatal hernia in her family; Hyperlipidemia in her mother; Kidney cancer in her father; Kidney disease in her family; Multiple sclerosis in her maternal grandfather, maternal grandmother, and paternal grandmother; No Known Problems in her sister, sister, sister, and sister; Other in her family and paternal grandfather; Skin cancer in her sister; Stroke in her father and maternal grandfather; Throat cancer in her sister  She  reports that she has been smoking  She has a 30 00 pack-year smoking history  She has never used smokeless tobacco  She reports current alcohol use of about 4 0 - 5 0 standard drinks of alcohol per week  She reports that she does not use drugs    Current Outpatient Medications   Medication Sig Dispense Refill    [START ON 7/14/2022] estradiol (ESTRACE) 0 1 mg/g vaginal cream Insert 0 5 g into the vagina 2 (two) times a week 42 5 g 3    Fergon 240 (27 Fe) MG tablet Take 1 tablet by mouth 2 (two) times a day with meals      levothyroxine 125 mcg tablet Take 1 tablet (125 mcg total) by mouth daily 90 tablet 1    propranolol (INDERAL LA) 120 mg 24 hr capsule Take 1 capsule (120 mg total) by mouth daily 90 capsule 1    SUMAtriptan (IMITREX) 5 MG/ACT nasal spray USE 1 SPRAY IN EACH NOSTRIL TWICE A DAY AS NEEDED FOR MIGRAINE 6 each 3     No current facility-administered medications for this visit  Current Outpatient Medications on File Prior to Visit   Medication Sig    Fergon 240 (27 Fe) MG tablet Take 1 tablet by mouth 2 (two) times a day with meals    levothyroxine 125 mcg tablet Take 1 tablet (125 mcg total) by mouth daily    propranolol (INDERAL LA) 120 mg 24 hr capsule Take 1 capsule (120 mg total) by mouth daily    SUMAtriptan (IMITREX) 5 MG/ACT nasal spray USE 1 SPRAY IN EACH NOSTRIL TWICE A DAY AS NEEDED FOR MIGRAINE    [DISCONTINUED] ferrous gluconate 324 (37 5 Fe) mg Take 1 tablet (324 mg total) by mouth 2 (two) times a day before meals     No current facility-administered medications on file prior to visit  She has No Known Allergies       Review of Systems   Constitutional: Negative for activity change, appetite change, chills, fatigue and fever  HENT: Negative for rhinorrhea, sneezing and sore throat  Eyes: Negative for visual disturbance  Respiratory: Negative for cough, shortness of breath and wheezing  Cardiovascular: Negative for chest pain, palpitations and leg swelling  Gastrointestinal: Negative for abdominal distention, constipation, diarrhea, nausea and vomiting  Genitourinary: Negative for difficulty urinating, dysuria, frequency, pelvic pain, urgency and vaginal discharge  Skin: Negative for rash  Neurological: Negative for syncope and light-headedness  Objective:      /74   Ht 5' 1 5" (1 562 m)   Wt 62 1 kg (137 lb)   Breastfeeding No   BMI 25 47 kg/m²          Physical Exam  Constitutional:       General: She is not in acute distress  Appearance: She is well-developed  She is not diaphoretic  Chest:   Breasts: Breasts are symmetrical       Right: No inverted nipple, mass, nipple discharge, skin change or tenderness  Left: No inverted nipple, mass, nipple discharge, skin change or tenderness         Abdominal:      General: Bowel sounds are normal  There is no distension  Palpations: Abdomen is soft  There is no mass  Tenderness: There is no abdominal tenderness  There is no guarding or rebound  Genitourinary:     Labia:         Right: No rash, tenderness, lesion or injury  Left: No rash, tenderness, lesion or injury  Vagina: No vaginal discharge or bleeding  Cervix: No cervical motion tenderness, discharge or friability  Uterus: Not deviated, not enlarged, not fixed and not tender  Adnexa:         Right: No mass, tenderness or fullness  Left: No mass, tenderness or fullness  Comments: Urethral meatus- no lesions, non tender  Urethra non tender  Bladder non tender  Thin, atrophic vaginal mucosa  Skin:     General: Skin is warm and dry  Coloration: Skin is not pale  Findings: No erythema or rash

## 2022-07-13 LAB
HPV HR 12 DNA CVX QL NAA+PROBE: NEGATIVE
HPV16 DNA CVX QL NAA+PROBE: NEGATIVE
HPV18 DNA CVX QL NAA+PROBE: NEGATIVE

## 2022-07-19 LAB
LAB AP GYN PRIMARY INTERPRETATION: NORMAL
Lab: NORMAL

## 2022-08-07 DIAGNOSIS — G43.909 MIGRAINE WITHOUT STATUS MIGRAINOSUS, NOT INTRACTABLE, UNSPECIFIED MIGRAINE TYPE: ICD-10-CM

## 2022-08-07 DIAGNOSIS — E03.9 HYPOTHYROIDISM, ADULT: ICD-10-CM

## 2022-08-07 DIAGNOSIS — I49.3 PVC (PREMATURE VENTRICULAR CONTRACTION): ICD-10-CM

## 2022-08-08 RX ORDER — PROPRANOLOL HYDROCHLORIDE 120 MG/1
CAPSULE, EXTENDED RELEASE ORAL
Qty: 90 CAPSULE | Refills: 1 | Status: SHIPPED | OUTPATIENT
Start: 2022-08-08

## 2022-08-08 RX ORDER — LEVOTHYROXINE SODIUM 0.12 MG/1
TABLET ORAL
Qty: 90 TABLET | Refills: 1 | Status: SHIPPED | OUTPATIENT
Start: 2022-08-08

## 2022-11-03 DIAGNOSIS — E03.9 HYPOTHYROIDISM, ADULT: Primary | ICD-10-CM

## 2022-11-03 DIAGNOSIS — G43.909 MIGRAINE WITHOUT STATUS MIGRAINOSUS, NOT INTRACTABLE, UNSPECIFIED MIGRAINE TYPE: ICD-10-CM

## 2022-11-03 DIAGNOSIS — E03.9 HYPOTHYROIDISM, ADULT: ICD-10-CM

## 2022-11-03 DIAGNOSIS — I49.3 PVC (PREMATURE VENTRICULAR CONTRACTION): ICD-10-CM

## 2022-11-03 DIAGNOSIS — E78.2 MIXED HYPERLIPIDEMIA: ICD-10-CM

## 2022-11-03 DIAGNOSIS — E55.9 VITAMIN D DEFICIENCY: ICD-10-CM

## 2022-11-03 DIAGNOSIS — D50.9 IRON DEFICIENCY ANEMIA, UNSPECIFIED IRON DEFICIENCY ANEMIA TYPE: ICD-10-CM

## 2022-11-03 PROBLEM — Z01.419 ENCOUNTER FOR GYNECOLOGICAL EXAMINATION WITHOUT ABNORMAL FINDING: Status: RESOLVED | Noted: 2022-07-12 | Resolved: 2022-11-03

## 2022-11-03 RX ORDER — SUMATRIPTAN 5 MG/1
SPRAY NASAL
Qty: 6 EACH | Refills: 1 | Status: SHIPPED | OUTPATIENT
Start: 2022-11-03 | End: 2022-11-04

## 2022-11-03 RX ORDER — PROPRANOLOL HYDROCHLORIDE 120 MG/1
120 CAPSULE, EXTENDED RELEASE ORAL DAILY
Qty: 90 CAPSULE | Refills: 1 | Status: SHIPPED | OUTPATIENT
Start: 2022-11-03

## 2022-11-03 RX ORDER — LEVOTHYROXINE SODIUM 0.12 MG/1
125 TABLET ORAL DAILY
Qty: 90 TABLET | Refills: 1 | Status: SHIPPED | OUTPATIENT
Start: 2022-11-03

## 2022-11-03 NOTE — TELEPHONE ENCOUNTER
This patient needs to set up a follow-up appointment  She was last seen in March  She needs to do labs that have been entered in the EMR and schedule follow-up  If not medications will not be able to be renewed

## 2022-11-03 NOTE — TELEPHONE ENCOUNTER
Called pt and lmom advising to contact the office back to schedule a follow up appointment and to have labs drawn prior to that visit

## 2022-11-04 DIAGNOSIS — G43.909 MIGRAINE WITHOUT STATUS MIGRAINOSUS, NOT INTRACTABLE, UNSPECIFIED MIGRAINE TYPE: ICD-10-CM

## 2022-11-04 RX ORDER — SUMATRIPTAN 5 MG/1
SPRAY NASAL
Qty: 6 EACH | Refills: 1 | Status: SHIPPED | OUTPATIENT
Start: 2022-11-04

## 2023-04-30 DIAGNOSIS — E03.9 HYPOTHYROIDISM, ADULT: ICD-10-CM

## 2023-04-30 DIAGNOSIS — I49.3 PVC (PREMATURE VENTRICULAR CONTRACTION): ICD-10-CM

## 2023-04-30 DIAGNOSIS — G43.909 MIGRAINE WITHOUT STATUS MIGRAINOSUS, NOT INTRACTABLE, UNSPECIFIED MIGRAINE TYPE: ICD-10-CM

## 2023-05-01 DIAGNOSIS — G43.909 MIGRAINE WITHOUT STATUS MIGRAINOSUS, NOT INTRACTABLE, UNSPECIFIED MIGRAINE TYPE: ICD-10-CM

## 2023-05-01 RX ORDER — LEVOTHYROXINE SODIUM 0.12 MG/1
TABLET ORAL
Qty: 90 TABLET | Refills: 1 | Status: SHIPPED | OUTPATIENT
Start: 2023-05-01

## 2023-05-01 RX ORDER — PROPRANOLOL HYDROCHLORIDE 120 MG/1
CAPSULE, EXTENDED RELEASE ORAL
Qty: 90 CAPSULE | Refills: 1 | Status: SHIPPED | OUTPATIENT
Start: 2023-05-01

## 2023-05-01 RX ORDER — SUMATRIPTAN 5 MG/1
SPRAY NASAL
Qty: 6 EACH | Refills: 1 | Status: SHIPPED | OUTPATIENT
Start: 2023-05-01

## 2023-05-01 NOTE — TELEPHONE ENCOUNTER
Will need follow-up soon, as it has almost been a year that she has been seen    Malini for her annual physical

## 2023-05-01 NOTE — TELEPHONE ENCOUNTER
I scheduled pt in a few weeks for phys,  She also is out of imitrix if we are able to fill this for her also

## 2023-09-20 ENCOUNTER — TELEPHONE (OUTPATIENT)
Dept: OBGYN CLINIC | Facility: CLINIC | Age: 58
End: 2023-09-20

## 2023-09-28 LAB — HBA1C MFR BLD HPLC: 5.7 %

## 2023-11-08 DIAGNOSIS — G43.909 MIGRAINE WITHOUT STATUS MIGRAINOSUS, NOT INTRACTABLE, UNSPECIFIED MIGRAINE TYPE: ICD-10-CM

## 2023-11-08 DIAGNOSIS — I49.3 PVC (PREMATURE VENTRICULAR CONTRACTION): ICD-10-CM

## 2023-11-09 RX ORDER — PROPRANOLOL HYDROCHLORIDE 120 MG/1
120 CAPSULE, EXTENDED RELEASE ORAL DAILY
Qty: 90 CAPSULE | Refills: 0 | Status: SHIPPED | OUTPATIENT
Start: 2023-11-09

## 2024-01-25 ENCOUNTER — TELEPHONE (OUTPATIENT)
Dept: INTERNAL MEDICINE CLINIC | Facility: CLINIC | Age: 59
End: 2024-01-25

## 2024-01-25 DIAGNOSIS — D50.9 IRON DEFICIENCY ANEMIA, UNSPECIFIED IRON DEFICIENCY ANEMIA TYPE: ICD-10-CM

## 2024-01-25 DIAGNOSIS — E78.2 MIXED HYPERLIPIDEMIA: ICD-10-CM

## 2024-01-25 DIAGNOSIS — E55.9 VITAMIN D DEFICIENCY: ICD-10-CM

## 2024-01-25 DIAGNOSIS — G43.909 MIGRAINE WITHOUT STATUS MIGRAINOSUS, NOT INTRACTABLE, UNSPECIFIED MIGRAINE TYPE: ICD-10-CM

## 2024-01-25 DIAGNOSIS — I49.3 PVC (PREMATURE VENTRICULAR CONTRACTION): ICD-10-CM

## 2024-01-25 DIAGNOSIS — E03.9 HYPOTHYROIDISM, ADULT: Primary | ICD-10-CM

## 2024-01-25 RX ORDER — PROPRANOLOL HYDROCHLORIDE 120 MG/1
120 CAPSULE, EXTENDED RELEASE ORAL DAILY
Qty: 90 CAPSULE | Refills: 0 | Status: SHIPPED | OUTPATIENT
Start: 2024-01-25

## 2024-01-25 NOTE — TELEPHONE ENCOUNTER
Patient scheduled a physical for 02/27. She would like to know if you want any labs done for this visit?    Call back #[phone]

## 2024-01-25 NOTE — TELEPHONE ENCOUNTER
"As per Donald, \"Labs entered into the EMR\"    Called patient and lmom advising that labs were ordered.  "

## 2024-03-06 ENCOUNTER — OFFICE VISIT (OUTPATIENT)
Dept: INTERNAL MEDICINE CLINIC | Facility: CLINIC | Age: 59
End: 2024-03-06
Payer: COMMERCIAL

## 2024-03-06 VITALS
OXYGEN SATURATION: 99 % | WEIGHT: 144 LBS | DIASTOLIC BLOOD PRESSURE: 64 MMHG | BODY MASS INDEX: 26.5 KG/M2 | SYSTOLIC BLOOD PRESSURE: 104 MMHG | HEART RATE: 60 BPM | HEIGHT: 62 IN

## 2024-03-06 DIAGNOSIS — F17.200 TOBACCO USE DISORDER: ICD-10-CM

## 2024-03-06 DIAGNOSIS — D50.9 IRON DEFICIENCY ANEMIA, UNSPECIFIED IRON DEFICIENCY ANEMIA TYPE: ICD-10-CM

## 2024-03-06 DIAGNOSIS — F17.200 NICOTINE DEPENDENCE WITH CURRENT USE: ICD-10-CM

## 2024-03-06 DIAGNOSIS — Z12.2 SCREENING FOR LUNG CANCER: ICD-10-CM

## 2024-03-06 DIAGNOSIS — E03.9 HYPOTHYROIDISM, ADULT: ICD-10-CM

## 2024-03-06 DIAGNOSIS — I49.3 PVC (PREMATURE VENTRICULAR CONTRACTION): ICD-10-CM

## 2024-03-06 DIAGNOSIS — E55.9 VITAMIN D DEFICIENCY: ICD-10-CM

## 2024-03-06 DIAGNOSIS — E78.2 MIXED HYPERLIPIDEMIA: ICD-10-CM

## 2024-03-06 DIAGNOSIS — K59.09 CHRONIC CONSTIPATION: ICD-10-CM

## 2024-03-06 DIAGNOSIS — Z00.00 ANNUAL PHYSICAL EXAM: Primary | ICD-10-CM

## 2024-03-06 DIAGNOSIS — Z12.31 ENCOUNTER FOR SCREENING MAMMOGRAM FOR MALIGNANT NEOPLASM OF BREAST: ICD-10-CM

## 2024-03-06 PROBLEM — S52.509A FRACTURE OF DISTAL END OF RADIUS: Status: RESOLVED | Noted: 2021-07-13 | Resolved: 2024-03-06

## 2024-03-06 PROCEDURE — 99214 OFFICE O/P EST MOD 30 MIN: CPT | Performed by: PHYSICIAN ASSISTANT

## 2024-03-06 PROCEDURE — 99396 PREV VISIT EST AGE 40-64: CPT | Performed by: PHYSICIAN ASSISTANT

## 2024-03-06 RX ORDER — ATOGEPANT 60 MG/1
TABLET ORAL
COMMUNITY

## 2024-03-06 RX ORDER — LIOTHYRONINE SODIUM 5 UG/1
5 TABLET ORAL DAILY
COMMUNITY

## 2024-03-06 NOTE — PROGRESS NOTES
Assessment/Plan:   General medical exam is acceptable.  Smoking sensation strongly encouraged.  Will send for low-dose CT lung screening.  With ongoing iron deficiency anemia and patient intolerant to oral iron we will send to hematology for consultation.  With history of chronic constipation which sounds to be IBS-C will ask GI for another opinion.  Please get us a copy of your labs that you are having done in April.  Schedule follow-up 1 year, sooner as needed.  Contact other provider that you are seeing in regards to samples of migraine medication that was provided to you.  Quality Measures:   Depression Screening and Follow-up Plan: Patient was screened for depression during today's encounter. They screened negative with a PHQ-2 score of 0.    Tobacco Cessation Counseling: Tobacco cessation counseling was provided. The patient is sincerely urged to quit consumption of tobacco. She is not ready to quit tobacco.          Return in about 1 year (around 3/6/2025) for Annual physical.         Diagnoses and all orders for this visit:    Annual physical exam    Hypothyroidism, adult    Iron deficiency anemia, unspecified iron deficiency anemia type  -     Ambulatory Referral to Hematology / Oncology; Future    Mixed hyperlipidemia    PVC (premature ventricular contraction)    Vitamin D deficiency    Encounter for screening mammogram for malignant neoplasm of breast  -     Mammo screening bilateral w 3d & cad; Future    Chronic constipation  -     Ambulatory referral to Gastroenterology; Future    Tobacco use disorder  -     CT lung screening program; Future    Screening for lung cancer  -     CT lung screening program; Future    Nicotine dependence with current use  -     CT lung screening program; Future    Other orders  -     liothyronine (Cytomel) 5 mcg tablet; Take 5 mcg by mouth daily  -     Atogepant (Qulipta) 60 MG TABS; Take by mouth          Subjective:      Patient ID: Inna Hill is a 58 y.o.  female.    58-year-old female presents for her annual physical.  I have learned today the patient is also seeing another provider who has been doing her labs and handling her hypothyroidism.  This provider has her on both levothyroxine and Cytomel, and according to patient low-dose testosterone.  Apparently this provider has also given her Qulipta samples for migraine which she states worked very well.  She was instructed to contact this provider for further medication in this regard.  She is also on propranolol LA for PVCs.    Patient has history of iron deficiency anemia: She has tried to supplement her iron with oral iron, and dietary iron all which has caused severe constipation for her.  She is not tolerating this type of iron replacement.  I have discussed possible referral to hematology for consultation regarding her iron deficiency and she is in agreement.    Vitamin D deficiency: Last vitamin D level done almost 2 years ago was low.  She states she is taking vitamin D annularly.  We will recheck level with next labs.    Chronic constipation: Partially related to iron use, but I also feel it is related to IBS-C.  I will refer back to GI for consultation as she has been using Metamucil, MiraLAX, Dulcolax and multiple other agents with little relief.    Tobacco use disorder: Discussed low-dose CT of the chest for screening, patient is in agreement to do.  Smoking cessation again strongly encouraged.  She is not speaking of desire to quit at this point.    Hyperlipidemia: I have encouraged patient to start lipid-lowering agent, however she is waiting for repeat labs to be done in April and is then going to discuss with the other provider.    EMR has been reviewed, clarified, and updated with patient today.        ALLERGIES:  No Known Allergies    CURRENT MEDICATIONS:    Current Outpatient Medications:   •  Atogepant (Qulipta) 60 MG TABS, Take by mouth, Disp: , Rfl:   •  levothyroxine 125 mcg tablet, take 1  tablet by mouth once daily, Disp: 90 tablet, Rfl: 1  •  liothyronine (Cytomel) 5 mcg tablet, Take 5 mcg by mouth daily, Disp: , Rfl:   •  propranolol (INDERAL LA) 120 mg 24 hr capsule, TAKE ONE CAPSULE BY MOUTH EVERY DAY, Disp: 90 capsule, Rfl: 0  •  SUMAtriptan (IMITREX) 5 MG/ACT nasal spray, Use 1 spray each nostril twice a day as needed for migraine, Disp: 6 each, Rfl: 1    ACTIVE PROBLEM LIST:  Patient Active Problem List   Diagnosis   • Hypothyroidism, adult   • Migraine   • PVC (premature ventricular contraction)   • Vitamin D deficiency   • Tobacco use disorder   • Iron deficiency anemia   • Mixed hyperlipidemia       PAST MEDICAL HISTORY:  Past Medical History:   Diagnosis Date   • Disease of thyroid gland    • Fracture of distal end of radius    • Migraine        PAST SURGICAL HISTORY:  Past Surgical History:   Procedure Laterality Date   • EGD AND COLONOSCOPY     • WRIST FRACTURE SURGERY Left     Open w hardware       FAMILY HISTORY:  Family History   Problem Relation Age of Onset   • Hyperlipidemia Mother    • Aortic aneurysm Father    • Stroke Father         CVA   • Kidney cancer Father    • Throat cancer Sister         HPV   • Skin cancer Sister    • No Known Problems Sister    • No Known Problems Sister    • No Known Problems Sister    • No Known Problems Sister    • Multiple sclerosis Maternal Grandmother    • Stroke Maternal Grandfather    • Multiple sclerosis Maternal Grandfather    • Multiple sclerosis Paternal Grandmother    • Other Paternal Grandfather         CVA   • Kidney disease Family         Benign hypertensive   • Other Family         Carcinosarcoma of the renal pelvis, Headache   • Heart disease Family    • Hiatal hernia Family    • Substance Abuse Neg Hx        SOCIAL HISTORY:  Social History     Socioeconomic History   • Marital status: /Civil Union     Spouse name: Not on file   • Number of children: 2   • Years of education: Not on file   • Highest education level: Not on file    Occupational History   • Occupation: [employer]   Tobacco Use   • Smoking status: Every Day     Current packs/day: 1.00     Average packs/day: 1 pack/day for 30.0 years (30.0 ttl pk-yrs)     Types: Cigarettes   • Smokeless tobacco: Never   Vaping Use   • Vaping status: Never Used   Substance and Sexual Activity   • Alcohol use: Yes     Alcohol/week: 4.0 - 5.0 standard drinks of alcohol     Types: 4 - 5 Standard drinks or equivalent per week     Comment: social   • Drug use: Never   • Sexual activity: Not Currently     Partners: Male     Birth control/protection: None   Other Topics Concern   • Not on file   Social History Narrative        2 children    Exercises regularly-walking, weight training    Activities-Boating    Dental care regularly    Brushes teeth twice daily    Caffeine use    Employed-realtor, part-time      Social Determinants of Health     Financial Resource Strain: Not on file   Food Insecurity: Not on file   Transportation Needs: Not on file   Physical Activity: Not on file   Stress: Not on file   Social Connections: Not on file   Intimate Partner Violence: Not on file   Housing Stability: Not on file       Review of Systems   Constitutional:  Negative for activity change, chills, fatigue and fever.   HENT:  Negative for congestion.    Eyes:  Negative for discharge.   Respiratory:  Negative for cough, chest tightness and shortness of breath.    Cardiovascular:  Negative for chest pain, palpitations and leg swelling.   Gastrointestinal:  Positive for constipation. Negative for abdominal pain, blood in stool, diarrhea, nausea and vomiting.   Endocrine: Negative for polydipsia, polyphagia and polyuria.   Genitourinary:  Negative for difficulty urinating.   Musculoskeletal:  Negative for arthralgias and myalgias.   Skin:  Negative for rash.   Allergic/Immunologic: Negative for immunocompromised state.   Neurological:  Positive for headaches. Negative for dizziness, syncope, weakness  "and light-headedness.   Hematological:  Negative for adenopathy. Does not bruise/bleed easily.   Psychiatric/Behavioral:  Negative for dysphoric mood, sleep disturbance and suicidal ideas. The patient is not nervous/anxious.          Objective:  Vitals:    03/06/24 1342   BP: 104/64   BP Location: Right arm   Patient Position: Sitting   Cuff Size: Standard   Pulse: 60   SpO2: 99%   Weight: 65.3 kg (144 lb)   Height: 5' 1.5\" (1.562 m)     Body mass index is 26.77 kg/m².     Physical Exam  Vitals and nursing note reviewed.   Constitutional:       General: She is not in acute distress.     Appearance: She is well-developed. She is not ill-appearing.      Comments: Well-developed, well-nourished 58-year-old female appearing about stated age no acute distress.   HENT:      Head: Normocephalic and atraumatic.      Right Ear: Tympanic membrane, ear canal and external ear normal.      Left Ear: Tympanic membrane, ear canal and external ear normal.      Nose: Nose normal.      Mouth/Throat:      Mouth: Mucous membranes are moist.      Pharynx: Oropharynx is clear.   Eyes:      General: No scleral icterus.        Right eye: No discharge.         Left eye: No discharge.      Extraocular Movements: Extraocular movements intact.      Conjunctiva/sclera: Conjunctivae normal.      Pupils: Pupils are equal, round, and reactive to light.   Neck:      Thyroid: No thyromegaly.      Vascular: No carotid bruit.   Cardiovascular:      Rate and Rhythm: Normal rate and regular rhythm.      Pulses: Normal pulses.      Heart sounds: Normal heart sounds. No murmur heard.     Comments: No extrasystoles audible today  Pulmonary:      Effort: Pulmonary effort is normal. No respiratory distress.      Breath sounds: Normal breath sounds.      Comments: Slight decreased overall breath sounds  Chest:      Chest wall: No tenderness.   Abdominal:      General: Abdomen is flat. Bowel sounds are normal. There is no distension.      Palpations: Abdomen " is soft. There is no hepatomegaly, splenomegaly or mass.      Tenderness: There is no abdominal tenderness. There is no right CVA tenderness, left CVA tenderness, guarding or rebound.   Musculoskeletal:         General: No swelling. Normal range of motion.      Cervical back: Normal range of motion and neck supple.      Right lower leg: No edema.      Left lower leg: No edema.   Lymphadenopathy:      Cervical: No cervical adenopathy.   Skin:     General: Skin is warm and dry.      Findings: No rash.   Neurological:      General: No focal deficit present.      Mental Status: She is alert and oriented to person, place, and time.      Cranial Nerves: No cranial nerve deficit.      Sensory: No sensory deficit.      Motor: No weakness.      Coordination: Coordination normal.      Gait: Gait normal.      Deep Tendon Reflexes: Reflexes are normal and symmetric. Reflexes normal.   Psychiatric:         Mood and Affect: Mood normal.         Behavior: Behavior normal.         Thought Content: Thought content normal.         Judgment: Judgment normal.           RESULTS:  Hemoglobin A1C   Date/Time Value Ref Range Status   03/23/2022 04:34 PM 5.5 6.5 Final     Cholesterol   Date/Time Value Ref Range Status   03/21/2022 02:33  (H) See Comment mg/dL Final     Comment:     Cholesterol:         Pediatric <18 Years        Desirable          <170 mg/dL      Borderline High    170-199 mg/dL      High               >=200 mg/dL        Adult >=18 Years            Desirable        <200 mg/dL      Borderline High  200-239 mg/dL      High             >239 mg/dL       Triglycerides   Date/Time Value Ref Range Status   03/21/2022 02:33 PM 68 See Comment mg/dL Final     Comment:     Triglyceride:     0-9Y            <75mg/dL     10Y-17Y         <90 mg/dL       >=18Y     Normal          <150 mg/dL     Borderline High 150-199 mg/dL     High            200-499 mg/dL        Very High       >499 mg/dL    Specimen collection should occur prior  to N-Acetylcysteine or Metamizole administration due to the potential for falsely depressed results.     HDL, Direct   Date/Time Value Ref Range Status   03/21/2022 02:33 PM 57 >=50 mg/dL Final     Comment:     Specimen collection should occur prior to Metamizole administration due to the potential for falsley depressed results.     LDL Calculated   Date/Time Value Ref Range Status   03/21/2022 02:33  (H) 0 - 100 mg/dL Final     Comment:     LDL Cholesterol:     Optimal           <100 mg/dl     Near Optimal      100-129 mg/dl     Above Optimal       Borderline High 130-159 mg/dl       High            160-189 mg/dl       Very High       >189 mg/dl         This screening LDL is a calculated result.   It does not have the accuracy of the Direct Measured LDL in the monitoring of patients with hyperlipidemia and/or statin therapy.   Direct Measure LDL ([id number]) must be ordered separately in these patients.     Hemoglobin   Date/Time Value Ref Range Status   03/21/2022 02:33 PM 9.3 (L) 11.5 - 15.4 g/dL Final     Hematocrit   Date/Time Value Ref Range Status   03/21/2022 02:33 PM 30.2 (L) 34.8 - 46.1 % Final     Platelets   Date/Time Value Ref Range Status   03/21/2022 02:33  149 - 390 Thousands/uL Final     TSH 3RD GENERATON   Date/Time Value Ref Range Status   03/21/2022 02:33 PM 0.587 0.358 - 3.740 uIU/mL Final     Comment:     The recommended reference ranges for TSH during pregnancy are as follows:   First trimester 0.1 to 2.5 uIU/mL   Second trimester  0.2 to 3.0 uIU/mL   Third trimester 0.3 to 3.0 uIU/m    Note: Normal ranges may not apply to patients who are transgender, non-binary, or whose legal sex, sex at birth, and gender identity differ.     Free T4   Date/Time Value Ref Range Status   03/21/2022 02:33 PM 1.52 (H) 0.76 - 1.46 ng/dL Final     Comment:     Specimen collection should occur prior to Sulfasalazine administration due to the potential for falsely elevated results.     Sodium   Date/Time  Value Ref Range Status   03/21/2022 02:33  136 - 145 mmol/L Final     BUN   Date/Time Value Ref Range Status   03/21/2022 02:33 PM 13 5 - 25 mg/dL Final     Creatinine   Date/Time Value Ref Range Status   03/21/2022 02:33 PM 0.73 0.60 - 1.30 mg/dL Final     Comment:     Standardized to IDMS reference method      In chart    This note was created with voice recognition software.  Phonic, grammatical and spelling errors may be present within the note as a result.

## 2024-03-06 NOTE — PATIENT INSTRUCTIONS
General medical exam is acceptable.  Smoking sensation strongly encouraged.  Will send for low-dose CT lung screening.  With ongoing iron deficiency anemia and patient intolerant to oral iron we will send to hematology for consultation.  With history of chronic constipation which sounds to be IBS-C will ask GI for another opinion.  Please get us a copy of your labs that you are having done in April.  Schedule follow-up 1 year, sooner as needed.      Wellness Visit for Adults   AMBULATORY CARE:   A wellness visit  is when you see your healthcare provider to get screened for health problems. Your healthcare provider will also give you advice on how to stay healthy. Write down your questions so you remember to ask them. Ask your healthcare provider how often you should have a wellness visit.  What happens at a wellness visit:  Your healthcare provider will ask about your health, and your family history of health problems. This includes high blood pressure, heart disease, and cancer. He or she will ask if you have symptoms that concern you, if you smoke, and about your mood. You may also be asked about your intake of medicines, supplements, food, and alcohol. Any of the following may be done:  Your weight  will be checked. Your height may also be checked so your body mass index (BMI) can be calculated. Your BMI shows if you are at a healthy weight.    Your blood pressure  and heart rate will be checked. Your temperature may also be checked.    Blood and urine tests  may be done. Blood tests may be done to check your cholesterol levels. Abnormal cholesterol levels increase your risk for heart disease and stroke. You may also need a blood or urine test to check for diabetes if you are at increased risk. Urine tests may be done to look for signs of an infection or kidney disease.    A physical exam  includes checking your heartbeat and lungs with a stethoscope. Your healthcare provider may also check your skin to look for  sun damage.    Screening tests  may be recommended. A screening test is done to check for diseases that may not cause symptoms. The screening tests you may need depend on your age, gender, family history, and lifestyle habits. For example, colorectal screening may be recommended if you are 50 years old or older.    Screening tests you need if you are a woman:   A Pap smear  is used to screen for cervical cancer. Pap smears are usually done every 3 to 5 years depending on your age. You may need them more often if you have had abnormal Pap smear test results in the past. Ask your healthcare provider how often you should have a Pap smear.    A mammogram  is an x-ray of your breasts to screen for breast cancer. Experts recommend mammograms every 2 years starting at age 50 years. You may need a mammogram at age 49 years or younger if you have an increased risk for breast cancer. Talk to your healthcare provider about when you should start having mammograms and how often you need them.    Vaccines you may need:   Get an influenza vaccine  every year. The influenza vaccine protects you from the flu. Several types of viruses cause the flu. The viruses change over time, so new vaccines are made each year.    Get a tetanus-diphtheria (Td) booster vaccine  every 10 years. This vaccine protects you against tetanus and diphtheria. Tetanus is a severe infection that may cause painful muscle spasms and lockjaw. Diphtheria is a severe bacterial infection that causes a thick covering in the back of your mouth and throat.    Get a human papillomavirus (HPV) vaccine  if you are female and aged 19 to 26 or male 19 to 21 and never received it. This vaccine protects you from HPV infection. HPV is the most common infection spread by sexual contact. HPV may also cause vaginal, penile, and anal cancers.    Get a pneumococcal vaccine  if you are aged 65 years or older. The pneumococcal vaccine is an injection given to protect you from  pneumococcal disease. Pneumococcal disease is an infection caused by pneumococcal bacteria. The infection may cause pneumonia, meningitis, or an ear infection.    Get a shingles vaccine  if you are 60 or older, even if you have had shingles before. The shingles vaccine is an injection to protect you from the varicella-zoster virus. This is the same virus that causes chickenpox. Shingles is a painful rash that develops in people who had chickenpox or have been exposed to the virus.    How to eat healthy:  My Plate is a model for planning healthy meals. It shows the types and amounts of foods that should go on your plate. Fruits and vegetables make up about half of your plate, and grains and protein make up the other half. A serving of dairy is included on the side of your plate. The amount of calories and serving sizes you need depends on your age, gender, weight, and height. Examples of healthy foods are listed below:  Eat a variety of vegetables  such as dark green, red, and orange vegetables. You can also include canned vegetables low in sodium (salt) and frozen vegetables without added butter or sauces.    Eat a variety of fresh fruits , canned fruit in 100% juice, frozen fruit, and dried fruit.    Include whole grains.  At least half of the grains you eat should be whole grains. Examples include whole-wheat bread, wheat pasta, brown rice, and whole-grain cereals such as oatmeal.    Eat a variety of protein foods such as seafood (fish and shellfish), lean meat, and poultry without skin (turkey and chicken). Examples of lean meats include pork leg, shoulder, or tenderloin, and beef round, sirloin, tenderloin, and extra lean ground beef. Other protein foods include eggs and egg substitutes, beans, peas, soy products, nuts, and seeds.    Choose low-fat dairy products such as skim or 1% milk or low-fat yogurt, cheese, and cottage cheese.    Limit unhealthy fats  such as butter, hard margarine, and shortening.        Exercise:  Exercise at least 30 minutes per day on most days of the week. Some examples of exercise include walking, biking, dancing, and swimming. You can also fit in more physical activity by taking the stairs instead of the elevator or parking farther away from stores. Include muscle strengthening activities 2 days each week. Regular exercise provides many health benefits. It helps you manage your weight, and decreases your risk for type 2 diabetes, heart disease, stroke, and high blood pressure. Exercise can also help improve your mood. Ask your healthcare provider about the best exercise plan for you.       General health and safety guidelines:   Do not smoke.  Nicotine and other chemicals in cigarettes and cigars can cause lung damage. Ask your healthcare provider for information if you currently smoke and need help to quit. E-cigarettes or smokeless tobacco still contain nicotine. Talk to your healthcare provider before you use these products.    Limit alcohol.  A drink of alcohol is 12 ounces of beer, 5 ounces of wine, or 1½ ounces of liquor.    Lose weight, if needed.  Being overweight increases your risk of certain health conditions. These include heart disease, high blood pressure, type 2 diabetes, and certain types of cancer.    Protect your skin.  Do not sunbathe or use tanning beds. Use sunscreen with a SPF 15 or higher. Apply sunscreen at least 15 minutes before you go outside. Reapply sunscreen every 2 hours. Wear protective clothing, hats, and sunglasses when you are outside.    Drive safely.  Always wear your seatbelt. Make sure everyone in your car wears a seatbelt. A seatbelt can save your life if you are in an accident. Do not use your cell phone when you are driving. This could distract you and cause an accident. Pull over if you need to make a call or send a text message.    Practice safe sex.  Use latex condoms if are sexually active and have more than one partner. Your healthcare  provider may recommend screening tests for sexually transmitted infections (STIs).    Wear helmets, lifejackets, and protective gear.  Always wear a helmet when you ride a bike or motorcycle, go skiing, or play sports that could cause a head injury. Wear protective equipment when you play sports. Wear a lifejacket when you are on a boat or doing water sports.    © Copyright [employer] 2023 Information is for End User's use only and may not be sold, redistributed or otherwise used for commercial purposes.  The above information is an  only. It is not intended as medical advice for individual conditions or treatments. Talk to your doctor, nurse or pharmacist before following any medical regimen to see if it is safe and effective for you.

## 2024-03-07 ENCOUNTER — TELEPHONE (OUTPATIENT)
Dept: ADMINISTRATIVE | Facility: OTHER | Age: 59
End: 2024-03-07

## 2024-03-07 NOTE — TELEPHONE ENCOUNTER
----- Message from Kaylin Puente sent at 3/6/2024  2:38 PM EST -----  Regarding: Care Gap Request - Colonoscopy     03/06/24 2:38 PM    Hello, our patient above has had CRC: Colonoscopy completed/performed. Please assist in updating the patient chart by making an External outreach to Dr. Castillo facility. The date of service is April of 2022    Thank you,  Kaylin Puente   [facility] [place]

## 2024-03-07 NOTE — LETTER
Procedure Request Form: Colonoscopy      Date Requested: 24  Patient: Inna Hill  Patient : 1965   Referring Provider: Fernando Hernandes PA-C        Date of Procedure ______________________________       The above patient has informed us that they have completed their   most recent Colonoscopy at your facility. Please complete   this form and attach all corresponding procedure reports/results.    Comments ____2022  ______________________________________________________  ____________________________________________________________________  ____________________________________________________________________  ____________________________________________________________________    Facility Completing Procedure _________________________________________    Form Completed By (print name) _______________________________________      Signature __________________________________________________________      These reports are needed for  compliance.    Please fax this completed form and a copy of the procedure report to our office located at [address] [place] [address] as soon as possible to Fax [phone] margarito Self: Phone [phone]    We thank you for your assistance in treating our mutual patient.

## 2024-03-07 NOTE — LETTER
Procedure Request Form: Colonoscopy      Date Requested: 24  Patient: Inna Hill  Patient : 1965   Referring Provider: Fernando Hernandes PA-C        Date of Procedure ______________________________       The above patient has informed us that they have completed their   most recent Colonoscopy at your facility. Please complete   this form and attach all corresponding procedure reports/results.    Comments ___2022  ______________________________________________________  ____________________________________________________________________  ____________________________________________________________________  ____________________________________________________________________    Facility Completing Procedure _________________________________________    Form Completed By (print name) _______________________________________      Signature __________________________________________________________      These reports are needed for  compliance.    Please fax this completed form and a copy of the procedure report to our office located at [address] [place] [address] as soon as possible to Fax [phone] margarito Self: Phone [phone]    We thank you for your assistance in treating our mutual patient.

## 2024-03-07 NOTE — TELEPHONE ENCOUNTER
Upon review of the In Basket request and the patient's chart, initial outreach has been made via fax to facility. Please see Contacts section for details.     Thank you  Clair Lr

## 2024-03-08 ENCOUNTER — TELEPHONE (OUTPATIENT)
Dept: HEMATOLOGY ONCOLOGY | Facility: CLINIC | Age: 59
End: 2024-03-08

## 2024-03-08 NOTE — TELEPHONE ENCOUNTER
I called Inna in response to a referral that was received for patient to establish care with Hematology.     Outreach was made to schedule a consultation.    I left a voicemail explaining the reason for my call and advised patient to call [facility] at [phone].  Another attempt will be made to contact patient.

## 2024-03-13 NOTE — TELEPHONE ENCOUNTER
As a follow-up, a second attempt has been made for outreach via fax to facility. Please see Contacts section for details.    Thank you  Clair Lr

## 2024-03-18 NOTE — TELEPHONE ENCOUNTER
Upon review of the In Basket request we were able to locate, review, and update the patient chart as requested for CRC: Colonoscopy.    Any additional questions or concerns should be emailed to the Practice Liaisons via the appropriate education email address, please do not reply via In Basket.    Thank you  Clair Lr

## 2024-03-20 ENCOUNTER — OFFICE VISIT (OUTPATIENT)
Dept: GASTROENTEROLOGY | Facility: CLINIC | Age: 59
End: 2024-03-20
Payer: COMMERCIAL

## 2024-03-20 VITALS
HEART RATE: 72 BPM | HEIGHT: 62 IN | TEMPERATURE: 97.8 F | SYSTOLIC BLOOD PRESSURE: 112 MMHG | DIASTOLIC BLOOD PRESSURE: 77 MMHG | OXYGEN SATURATION: 100 % | BODY MASS INDEX: 26.54 KG/M2 | WEIGHT: 144.2 LBS

## 2024-03-20 DIAGNOSIS — R14.0 BLOATING: ICD-10-CM

## 2024-03-20 DIAGNOSIS — D50.9 IRON DEFICIENCY ANEMIA, UNSPECIFIED IRON DEFICIENCY ANEMIA TYPE: Primary | ICD-10-CM

## 2024-03-20 DIAGNOSIS — K59.09 CHRONIC CONSTIPATION: ICD-10-CM

## 2024-03-20 PROCEDURE — 99203 OFFICE O/P NEW LOW 30 MIN: CPT | Performed by: PHYSICIAN ASSISTANT

## 2024-03-20 NOTE — PROGRESS NOTES
[facility] - Outpatient Consultation  Inna Hill 58 y.o. female MRN: [id number]  Encounter: [id number]          ASSESSMENT AND PLAN:      1. Iron deficiency anemia, unspecified iron deficiency anemia type  New as of 2022  EGD and Colonoscopy with a finding of hemorrhoids  Duodenal biopsies negative for celiac  Video capsule endoscopy was advised at that time however she never followed up for this -will schedule    She has made an appointment with hematology  She cannot tolerate oral iron    2. Bloating  New in the past 2-3 years  No pain, nausea, constipation, diarrhea  Will plan SIBO testing  Start Align  Use Peppermint oil PRN    EGD and Colonoscopy with duodenal biopsies in 2022 were negative    Consider trial of GFD vs low FODMAP diet  ______________________________________________________________________    HPI: 58-year-old female with a history of hypothyroidism who presents for evaluation of anemia and bloating.  She was first noted to be anemic in 2022.  She was evaluated by gastroenterology at which point she had an EGD and a colonoscopy.  She had internal hemorrhoids noted but otherwise the exams were normal.  Biopsies of the duodenum were negative for celiac disease.  She was advised to follow-up for a video capsule endoscopy but she did not do this.  Labs from September note ongoing anemia with a hemoglobin of 9.7.  Interestingly, she is normocytic.  Old iron levels are noted to be severely low.  She has a repeat order for all of this in the chart but she has not yet had it done yet.  She reports significant bloating.  This is a symptom that has started within the past 2 to 3 years.  She has regular bowel movements.  The only time she becomes constipated which is quite severe as if she takes an iron supplement.  Despite having regular bowel movements she never feels as if she is completely empty.  She has a very healthy diet and eats plenty of fiber and drinks lots of  water.  She reports that her hemorrhoids do bleed from time to time.  She reports that when they bleed it is quite heavy.  Because of that she was advised by colorectal surgery in 2022 to undergo a hemorrhoidectomy but again she has not followed up yet for this.  She reports that she is bloated constantly throughout the day but it does get worse after eating.  She also reports that it seems to get worse after a bowel movement.  There is no unexpected weight loss.  There is no family history of any serious gastrointestinal disorders that she is aware of.  She does note that dairy makes the symptoms worse.      REVIEW OF SYSTEMS:    CONSTITUTIONAL: Denies any fever, chills, rigors, and weight loss.  HEENT: No earache or tinnitus. Denies hearing loss or visual disturbances.  CARDIOVASCULAR: No chest pain or palpitations.   RESPIRATORY: Denies any cough, hemoptysis, shortness of breath or dyspnea on exertion.  GASTROINTESTINAL: As noted in the History of Present Illness.   GENITOURINARY: No problems with urination. Denies any hematuria or dysuria.  NEUROLOGIC: No dizziness or vertigo, denies headaches.   MUSCULOSKELETAL: Denies any muscle or joint pain.   SKIN: Denies skin rashes or itching.   ENDOCRINE: Denies excessive thirst. Denies intolerance to heat or cold.  PSYCHOSOCIAL: Denies depression or anxiety. Denies any recent memory loss.       Historical Information   Past Medical History:   Diagnosis Date    Disease of thyroid gland     Fracture of distal end of radius     Migraine      Past Surgical History:   Procedure Laterality Date    EGD AND COLONOSCOPY      WRIST FRACTURE SURGERY Left     Open w hardware     Social History   Social History     Substance and Sexual Activity   Alcohol Use Yes    Alcohol/week: 4.0 - 5.0 standard drinks of alcohol    Types: 4 - 5 Standard drinks or equivalent per week    Comment: social     Social History     Substance and Sexual Activity   Drug Use Never     Social History  "    Tobacco Use   Smoking Status Every Day    Current packs/day: 1.00    Average packs/day: 1 pack/day for 30.0 years (30.0 ttl pk-yrs)    Types: Cigarettes   Smokeless Tobacco Never     Family History   Problem Relation Age of Onset    Hyperlipidemia Mother     Aortic aneurysm Father     Stroke Father         CVA    Kidney cancer Father     Throat cancer Sister         HPV    Skin cancer Sister     No Known Problems Sister     No Known Problems Sister     No Known Problems Sister     No Known Problems Sister     Multiple sclerosis Maternal Grandmother     Stroke Maternal Grandfather     Multiple sclerosis Maternal Grandfather     Multiple sclerosis Paternal Grandmother     Other Paternal Grandfather         CVA    Kidney disease Family         Benign hypertensive    Other Family         Carcinosarcoma of the renal pelvis, Headache    Heart disease Family     Hiatal hernia Family     Substance Abuse Neg Hx        Meds/Allergies       Current Outpatient Medications:     Atogepant (Qulipta) 60 MG TABS    levothyroxine 125 mcg tablet    liothyronine (Cytomel) 5 mcg tablet    propranolol (INDERAL LA) 120 mg 24 hr capsule    SUMAtriptan (IMITREX) 5 MG/ACT nasal spray    No Known Allergies        Objective     Blood pressure 112/77, pulse 72, temperature 97.8 °F (36.6 °C), height 5' 1.5\" (1.562 m), weight 65.4 kg (144 lb 3.2 oz), SpO2 100%, not currently breastfeeding. Body mass index is 26.81 kg/m².        PHYSICAL EXAM:      General Appearance:   Alert, cooperative, no distress   HEENT:   Normocephalic, atraumatic, anicteric.     Neck:  Supple, symmetrical, trachea midline   Lungs:   Clear to auscultation bilaterally; no rales, rhonchi or wheezing; respirations unlabored    Heart::   Regular rate and rhythm; no murmur, rub, or gallop.   Abdomen:   Soft, non-tender, non-distended; normal bowel sounds; no masses, no organomegaly    Genitalia:   Deferred    Rectal:   Deferred    Extremities:  No cyanosis, clubbing or edema "    Pulses:  2+ and symmetric    Skin:  No jaundice, rashes, or lesions    Lymph nodes:  No palpable cervical lymphadenopathy        Lab Results:   No visits with results within 1 Day(s) from this visit.   Latest known visit with results is:   Orders Only on 09/28/2023   Component Date Value    Hemoglobin A1C 09/28/2023 5.7          Radiology Results:   No results found.

## 2024-03-28 ENCOUNTER — TELEPHONE (OUTPATIENT)
Dept: GASTROENTEROLOGY | Facility: CLINIC | Age: 59
End: 2024-03-28

## 2024-03-28 LAB — HBA1C MFR BLD HPLC: 5.9 %

## 2024-03-28 NOTE — TELEPHONE ENCOUNTER
Patient dropped off SIBO test.   Sent it via  to [facility] Attn: Latonia Gutierrez

## 2024-03-29 ENCOUNTER — CLINICAL SUPPORT (OUTPATIENT)
Dept: GASTROENTEROLOGY | Facility: CLINIC | Age: 59
End: 2024-03-29
Payer: COMMERCIAL

## 2024-03-29 DIAGNOSIS — R14.0 BLOATING: Primary | ICD-10-CM

## 2024-03-29 PROCEDURE — 91065 BREATH HYDROGEN/METHANE TEST: CPT | Performed by: PHYSICIAN ASSISTANT

## 2024-03-29 NOTE — Clinical Note
Please forward to Felicia Guevara PA-C once reviewed. Thanks!
Salvador Alexander's results are attached, please let me know if you have any questions.  Thanks-Mina
no

## 2024-04-01 NOTE — PROGRESS NOTES
[facility]       Bacterial Overgrowth Analytical Record    Inna Hill 58 y.o. female MRN: [id number]      Date of Test: 3/28/24    Substrate Given: Lactulose    Ordering Provider: Felicia Guevara PA-C    Medical Assistant: Caroline Sumner    Symptoms: bloating    The patient presents for bacterial overgrowth testing.    Patient fasted overnight. Baseline readings obtained.   Breath test performed every 20 min for a total of 3 hr    Sample Clock Time ppmH2 ppmCH4 Co2% Ammy   Baseline 10:25am   0 2 0.8 TOO MUCH ROOM AIR   #1  20 minutes 10:45am 3 4 4.3 1.27   #2  40 minutes 11:05am 7 5 3.1 1.77   #3  60 minutes 11:25am 23 9 3.6 1.52   #4  80 minutes 11:45am 20 10 2.7 2.03   #5  100 minutes 12:05pm 73 14 3.6 1.52   #6  120 minutes 12:25pm 94 13 3.8 1.44   #7  140 minutes 12:45pm 75 12 4.0 1.37   #8  160 minutes 01:06pm 95 16 3.4 1.61   #9  180 minutes 01:26pm 90 15 3.6 1.52       Physician interpretation:     Unknown baseline due to high room air  Nevertheless, meets criteria for SIBO with H2 increase of 20 between the 20-minute and 60 minutes specimens.  Elevated values beyond the 100-minute unique likely due to colonic fermentation.  Results forwarded to ordering provider

## 2024-04-09 ENCOUNTER — PROCEDURE VISIT (OUTPATIENT)
Dept: GASTROENTEROLOGY | Facility: CLINIC | Age: 59
End: 2024-04-09
Payer: COMMERCIAL

## 2024-04-09 DIAGNOSIS — D50.9 IRON DEFICIENCY ANEMIA, UNSPECIFIED IRON DEFICIENCY ANEMIA TYPE: Primary | ICD-10-CM

## 2024-04-10 ENCOUNTER — TELEPHONE (OUTPATIENT)
Dept: GASTROENTEROLOGY | Facility: CLINIC | Age: 59
End: 2024-04-10

## 2024-04-10 DIAGNOSIS — K58.0 IRRITABLE BOWEL SYNDROME WITH DIARRHEA: Primary | ICD-10-CM

## 2024-04-10 PROCEDURE — 91110 GI TRC IMG INTRAL ESOPH-ILE: CPT | Performed by: INTERNAL MEDICINE

## 2024-04-10 NOTE — TELEPHONE ENCOUNTER
Pill Cam - As per Dr. Ya it was normal. Called pt and advised.  Pt wants to know want is the next step. She advised that her SIBO test was positive. She is scheduled to see Hem on 5/1 to discuss Iron infusion. Please advise. Thanks.

## 2024-04-23 DIAGNOSIS — K58.0 IRRITABLE BOWEL SYNDROME WITH DIARRHEA: ICD-10-CM

## 2024-04-25 ENCOUNTER — TELEPHONE (OUTPATIENT)
Dept: GASTROENTEROLOGY | Facility: CLINIC | Age: 59
End: 2024-04-25

## 2024-04-25 NOTE — TELEPHONE ENCOUNTER
PA for Xifaxan    Submitted via    []CMM-KEY   [][employer]-Case ID #   [x][facility]   []Other website   []Phone call Case ID #     Office notes sent, clinical questions answered. Awaiting determination    Turnaround time for your insurance to make a decision on your Prior Authorization can take 7-21 business days.

## 2024-04-25 NOTE — TELEPHONE ENCOUNTER
Received call from [employer] needing Prior Auth for Xifaxan 550 mg, Patient takes it 1 tablet (550 mg total) by mouth every 8 (eight) hours for 14 days.    Key Code: [id number]    Please Initiate Prior Authorization.

## 2024-04-27 DIAGNOSIS — G43.909 MIGRAINE WITHOUT STATUS MIGRAINOSUS, NOT INTRACTABLE, UNSPECIFIED MIGRAINE TYPE: ICD-10-CM

## 2024-04-27 DIAGNOSIS — I49.3 PVC (PREMATURE VENTRICULAR CONTRACTION): ICD-10-CM

## 2024-04-27 RX ORDER — PROPRANOLOL HYDROCHLORIDE 120 MG/1
120 CAPSULE, EXTENDED RELEASE ORAL DAILY
Qty: 90 CAPSULE | Refills: 0 | Status: SHIPPED | OUTPATIENT
Start: 2024-04-27

## 2024-04-29 NOTE — TELEPHONE ENCOUNTER
PA for Xifaxan Approved     Date(s) approved until 5/10/2024    Case #INIT [id number]    Patient advised by          [x] Bswifthart Message  [] Phone call   []LMOM  []L/M to call office as no active Communication consent on file  []Unable to leave detailed message as VM not approved on Communication consent         Pharmacy advised by    [x]Fax  []Phone call    Approval letter scanned into Media Yes

## 2024-05-01 ENCOUNTER — OFFICE VISIT (OUTPATIENT)
Dept: HEMATOLOGY ONCOLOGY | Facility: CLINIC | Age: 59
End: 2024-05-01
Payer: COMMERCIAL

## 2024-05-01 ENCOUNTER — TELEPHONE (OUTPATIENT)
Dept: HEMATOLOGY ONCOLOGY | Facility: CLINIC | Age: 59
End: 2024-05-01

## 2024-05-01 VITALS
HEIGHT: 61 IN | BODY MASS INDEX: 27.38 KG/M2 | OXYGEN SATURATION: 98 % | HEART RATE: 74 BPM | DIASTOLIC BLOOD PRESSURE: 78 MMHG | RESPIRATION RATE: 16 BRPM | SYSTOLIC BLOOD PRESSURE: 128 MMHG | WEIGHT: 145 LBS | TEMPERATURE: 98.1 F

## 2024-05-01 DIAGNOSIS — Z12.31 SCREENING MAMMOGRAM FOR BREAST CANCER: ICD-10-CM

## 2024-05-01 DIAGNOSIS — D50.9 IRON DEFICIENCY ANEMIA, UNSPECIFIED IRON DEFICIENCY ANEMIA TYPE: Primary | ICD-10-CM

## 2024-05-01 PROCEDURE — 99204 OFFICE O/P NEW MOD 45 MIN: CPT | Performed by: PHYSICIAN ASSISTANT

## 2024-05-01 NOTE — PROGRESS NOTES
[facility] [address] [address]  Hematology Ambulatory Consult  Inna Hill, 1965, [id number]  5/1/2024      Assessment and Plan   1. Iron deficiency anemia, unspecified iron deficiency anemia type  - Ambulatory Referral to Hematology / Oncology  - CBC and differential; Future  - Iron Panel (Includes Ferritin, Iron Sat%, Iron, and TIBC); Future  - Reticulocytes; Future  - LD,Blood; Future  - Haptoglobin; Future  - Vitamin B12; Future  - Methylmalonic acid, serum; Future  - UA w Reflex to Microscopic w Reflex to Culture; Future  2. Screening mammogram for breast cancer    58-year-old female who is seen in consultation today for evaluation/management of iron deficiency anemia.  This has been present since at least 2022.  Prior GI workup showed hemorrhoids, otherwise was unrevealing including recent VCE.  She has been diagnosed with SIBO and is planning to begin Xifaxan.  Patient had been on oral iron supplements for approximately 3 to 4 months however stopped taking due to side effects of constipation and worsening her hemorrhoidal bleeding. Most recent labs show hgb 11.7 g/dL with microcytosis. No there bleeding. She is having RLQ pain. No localized tenderness, rebound or guarding on exam. She is NOT up to date on her age related cancer screening (ie breast cancer, lung cancer screening).     Discussion/Plan   Hgb is improved however she continues to have borderline anemia. Suspect persistent hypoferremia. Obtain additional labs to check iron stores, r/o hemolysis. Unlikely hemoglobinopathy as normal MCV in past.  Will call the patient with lab results when they become available to me and discuss treatment options.  Etiology of iron deficiency likely combination of hemorrhoidal blood loss and SIBO.  Recommend to pursue age-related cancer screenings.  Reasoning was reviewed with patient.  She is agreeable to mammogram. Our  "office will schedule.   For LLQ pain, check UA r/o UTI. Advised to schedule follow up with PCP for further evaluation. Got to ED if pain worsens.  RTC 3 months     Patient voiced agreement and understanding to the above.   Patient knows to call the Hematology/Oncology office with any questions and concerns regarding the above.    Barrier(s) to care: None.    Mel Bhat PA-C  Medical Oncology/Hematology  [facility]    Subjective   No chief complaint on file.      Referring provider    Fernando Hernandes PA-C  [id number]  [place] [id number]    History of present illness: 58-year-old female with history of hypothyroidism who presents in consultation today for evaluation of iron deficiency anemia.  Anemia first identified in 2022.  Labs were consistent with iron deficiency anemia with ferritin of 4.  She had endoscopy/colonoscopy which showed hemorrhoids, otherwise were unrevealing. Biopsies were negative for celiac's disease. She was supposed to have she was recommended to have VCE however it never had this performed.  Hemorrhoidectomy was recommended due to heavy bleeding from hemorrhoids however this was not pursued by patient.     Most recent labs from 09/2023-> WBC 5.9, hemoglobin 0.7, MCV 78, RDI 17 platelets 208,000.  WBC differential is normal.  UA negative for hematuria.  B12 356.  TSH 4.6.    She is still having hemorrhoidal bleeding after strenuous bowel movements. Will have \"dripping\" BRBPR occasionally with clots. Has to wear pads when this occurs. No longer menstruating for > 5 years. No melena. She was on oral iron daily from 09/2023 stopped taking after 3-4 months. Stopped taking due to side effect of constipation despite increased fiber/fluid intake and OTC laxatives. She was recently diagnosed with SIBO, planning to begin Xifaxan. She is planning to start this today.  Complains of pica and RLQ pain x 2 weeks. Pain in sharp at times. Had some left sided back pain now " resolved. No dysuria.  Denies shortness of breath, chest pain, lightheadedness or dizziness.  Consumes well-rounded diet.  No history of bariatric surgery or PPI use. Last mammogram 03/2022 which was normal. Smokes 1ppd x 35 years. Social alcohol use. No drug use. No personal hx of cancer  - sister has head and neck cancer (smoker). No family history of anemia.     03/2022: WBC 8.63, hgb 9.3, MCV 75, PLT 233k diff normal. CMP unremarkable. Iron sat 4%, ferritin 4, TIBC elevated. She underwent endoscopy/colonospy which showed hemorrhoids, otherwise normal. Duodenal biopsy negative for malabsorptive enteropathy and malignancy.     09/2023-> WBC 5.9, hemoglobin 0.7, MCV 78, RDI 17 platelets 208,000.  WBC differential is normal.  UA negative for hematuria.  B12 356.  TSH 4.6.    04/2024: VCE wo evidence of bleeding     Last mammogram 03/2022:: No malignancy.     05/01/24: +PICA    Review of Systems   Constitutional:  Negative for fatigue, fever and unexpected weight change.        No night sweats     Respiratory:  Negative for shortness of breath.    Cardiovascular:  Negative for chest pain.   Gastrointestinal:  Positive for anal bleeding.   Genitourinary:  Negative for hematuria and vaginal bleeding.   Skin:  Negative for rash.   Neurological:  Negative for dizziness and light-headedness.   Hematological:  Negative for adenopathy. Does not bruise/bleed easily.   All other systems reviewed and are negative.      Past Medical History:   Diagnosis Date    Disease of thyroid gland     Fracture of distal end of radius     Migraine      Past Surgical History:   Procedure Laterality Date    EGD AND COLONOSCOPY      WRIST FRACTURE SURGERY Left     Open w hardware     Family History   Problem Relation Age of Onset    Hyperlipidemia Mother     Aortic aneurysm Father     Stroke Father         CVA    Kidney cancer Father     Throat cancer Sister         HPV    Skin cancer Sister     No Known Problems Sister     No Known Problems  Sister     No Known Problems Sister     No Known Problems Sister     Multiple sclerosis Maternal Grandmother     Stroke Maternal Grandfather     Multiple sclerosis Maternal Grandfather     Multiple sclerosis Paternal Grandmother     Other Paternal Grandfather         CVA    Kidney disease Family         Benign hypertensive    Other Family         Carcinosarcoma of the renal pelvis, Headache    Heart disease Family     Hiatal hernia Family     Substance Abuse Neg Hx      Social History     Socioeconomic History    Marital status: /Civil Union     Spouse name: Not on file    Number of children: 2    Years of education: Not on file    Highest education level: Not on file   Occupational History    Occupation:    Tobacco Use    Smoking status: Every Day     Current packs/day: 1.00     Average packs/day: 1 pack/day for 30.0 years (30.0 ttl pk-yrs)     Types: Cigarettes    Smokeless tobacco: Never   Vaping Use    Vaping status: Never Used   Substance and Sexual Activity    Alcohol use: Yes     Alcohol/week: 4.0 - 5.0 standard drinks of alcohol     Types: 4 - 5 Standard drinks or equivalent per week     Comment: social    Drug use: Never    Sexual activity: Not Currently     Partners: Male     Birth control/protection: None   Other Topics Concern    Not on file   Social History Narrative        2 children    Exercises regularly-walking, weight training    Activities-Boating    Dental care regularly    Brushes teeth twice daily    Caffeine use    Employed-realtor, part-time      Social Determinants of Health     Financial Resource Strain: Not on file   Food Insecurity: Not on file   Transportation Needs: Not on file   Physical Activity: Not on file   Stress: Not on file   Social Connections: Not on file   Intimate Partner Violence: Not on file   Housing Stability: Not on file         Current Outpatient Medications:     Atogepant (Qulipta) 60 MG TABS, Take by mouth, Disp: , Rfl:      levothyroxine 125 mcg tablet, take 1 tablet by mouth once daily, Disp: 90 tablet, Rfl: 1    liothyronine (Cytomel) 5 mcg tablet, Take 5 mcg by mouth daily, Disp: , Rfl:     propranolol (INDERAL LA) 120 mg 24 hr capsule, TAKE ONE CAPSULE BY MOUTH EVERY DAY, Disp: 90 capsule, Rfl: 0    rifaximin (XIFAXAN) 550 mg tablet, Take 1 tablet (550 mg total) by mouth every 8 (eight) hours for 14 days, Disp: 42 tablet, Rfl: 0    SUMAtriptan (IMITREX) 5 MG/ACT nasal spray, Use 1 spray each nostril twice a day as needed for migraine, Disp: 6 each, Rfl: 1  No Known Allergies    Objective   There were no vitals taken for this visit.  Physical Exam  Vitals reviewed.   HENT:      Head: Normocephalic.   Cardiovascular:      Rate and Rhythm: Normal rate and regular rhythm.   Pulmonary:      Effort: Pulmonary effort is normal.      Breath sounds: Normal breath sounds.   Abdominal:      Palpations: Abdomen is soft.      Tenderness: There is no abdominal tenderness.   Musculoskeletal:      Cervical back: Neck supple.   Skin:     Findings: No rash.   Neurological:      Mental Status: She is alert.         Result Review  Labs:  No visits with results within 30 Day(s) from this visit.   Latest known visit with results is:   Orders Only on 09/28/2023   Component Date Value Ref Range Status    Hemoglobin A1C 09/28/2023 5.7   Final       Imaging:   No relevant imaging     Please note:  This report has been generated by a voice recognition software system. Therefore there may be syntax, spelling, and/or grammatical errors. Please call if you have any questions.

## 2024-05-01 NOTE — TELEPHONE ENCOUNTER
Appointment Confirmation   Who are you speaking with? Patient   If it is not the patient, are they listed on an active communication consent form? N/A   Which provider is the appointment scheduled with?  Mel Bhat    When is the appointment scheduled?  Please list date and time 5/1/24 3:30 PM   At which location is the appointment scheduled to take place? [facility]   Did caller verbalize understanding of appointment details? Yes

## 2024-05-24 ENCOUNTER — OFFICE VISIT (OUTPATIENT)
Dept: GASTROENTEROLOGY | Facility: CLINIC | Age: 59
End: 2024-05-24
Payer: COMMERCIAL

## 2024-05-24 ENCOUNTER — APPOINTMENT (OUTPATIENT)
Dept: LAB | Facility: HOSPITAL | Age: 59
End: 2024-05-24
Payer: COMMERCIAL

## 2024-05-24 VITALS
HEIGHT: 61 IN | SYSTOLIC BLOOD PRESSURE: 125 MMHG | WEIGHT: 143 LBS | BODY MASS INDEX: 27 KG/M2 | DIASTOLIC BLOOD PRESSURE: 76 MMHG | OXYGEN SATURATION: 98 % | HEART RATE: 78 BPM | TEMPERATURE: 97.5 F

## 2024-05-24 DIAGNOSIS — R14.0 BLOATING: ICD-10-CM

## 2024-05-24 DIAGNOSIS — D50.9 IRON DEFICIENCY ANEMIA, UNSPECIFIED IRON DEFICIENCY ANEMIA TYPE: ICD-10-CM

## 2024-05-24 DIAGNOSIS — D50.9 IRON DEFICIENCY ANEMIA, UNSPECIFIED IRON DEFICIENCY ANEMIA TYPE: Primary | ICD-10-CM

## 2024-05-24 LAB
BASOPHILS # BLD AUTO: 0.07 THOUSANDS/ÂΜL (ref 0–0.1)
BASOPHILS NFR BLD AUTO: 1 % (ref 0–1)
BILIRUB UR QL STRIP: NEGATIVE
CLARITY UR: CLEAR
COLOR UR: NORMAL
EOSINOPHIL # BLD AUTO: 0.16 THOUSAND/ÂΜL (ref 0–0.61)
EOSINOPHIL NFR BLD AUTO: 2 % (ref 0–6)
ERYTHROCYTE [DISTWIDTH] IN BLOOD BY AUTOMATED COUNT: 15.2 % (ref 11.6–15.1)
FERRITIN SERPL-MCNC: 5 NG/ML (ref 11–307)
GLUCOSE UR STRIP-MCNC: NEGATIVE MG/DL
HCT VFR BLD AUTO: 37.7 % (ref 34.8–46.1)
HGB BLD-MCNC: 11.8 G/DL (ref 11.5–15.4)
HGB UR QL STRIP.AUTO: NEGATIVE
IMM GRANULOCYTES # BLD AUTO: 0.01 THOUSAND/UL (ref 0–0.2)
IMM GRANULOCYTES NFR BLD AUTO: 0 % (ref 0–2)
IRON SATN MFR SERPL: 10 % (ref 15–50)
IRON SERPL-MCNC: 48 UG/DL (ref 50–212)
KETONES UR STRIP-MCNC: NEGATIVE MG/DL
LDH SERPL-CCNC: 129 U/L (ref 140–271)
LEUKOCYTE ESTERASE UR QL STRIP: NEGATIVE
LYMPHOCYTES # BLD AUTO: 2.67 THOUSANDS/ÂΜL (ref 0.6–4.47)
LYMPHOCYTES NFR BLD AUTO: 38 % (ref 14–44)
MCH RBC QN AUTO: 24.3 PG (ref 26.8–34.3)
MCHC RBC AUTO-ENTMCNC: 31.3 G/DL (ref 31.4–37.4)
MCV RBC AUTO: 78 FL (ref 82–98)
MONOCYTES # BLD AUTO: 0.53 THOUSAND/ÂΜL (ref 0.17–1.22)
MONOCYTES NFR BLD AUTO: 8 % (ref 4–12)
NEUTROPHILS # BLD AUTO: 3.52 THOUSANDS/ÂΜL (ref 1.85–7.62)
NEUTS SEG NFR BLD AUTO: 51 % (ref 43–75)
NITRITE UR QL STRIP: NEGATIVE
NRBC BLD AUTO-RTO: 0 /100 WBCS
PH UR STRIP.AUTO: 5.5 [PH]
PLATELET # BLD AUTO: 286 THOUSANDS/UL (ref 149–390)
PMV BLD AUTO: 10.9 FL (ref 8.9–12.7)
PROT UR STRIP-MCNC: NEGATIVE MG/DL
RBC # BLD AUTO: 4.86 MILLION/UL (ref 3.81–5.12)
RETICS # AUTO: NORMAL 10*3/UL (ref 14097–95744)
RETICS # CALC: 1.17 % (ref 0.37–1.87)
SP GR UR STRIP.AUTO: 1.02 (ref 1–1.03)
TIBC SERPL-MCNC: 467 UG/DL (ref 250–450)
UIBC SERPL-MCNC: 419 UG/DL (ref 155–355)
UROBILINOGEN UR STRIP-ACNC: <2 MG/DL
VIT B12 SERPL-MCNC: 155 PG/ML (ref 180–914)
WBC # BLD AUTO: 6.96 THOUSAND/UL (ref 4.31–10.16)

## 2024-05-24 PROCEDURE — 99213 OFFICE O/P EST LOW 20 MIN: CPT | Performed by: PHYSICIAN ASSISTANT

## 2024-05-24 PROCEDURE — 82607 VITAMIN B-12: CPT

## 2024-05-24 PROCEDURE — 82728 ASSAY OF FERRITIN: CPT

## 2024-05-24 PROCEDURE — 85045 AUTOMATED RETICULOCYTE COUNT: CPT

## 2024-05-24 PROCEDURE — 83615 LACTATE (LD) (LDH) ENZYME: CPT

## 2024-05-24 PROCEDURE — 83540 ASSAY OF IRON: CPT

## 2024-05-24 PROCEDURE — 36415 COLL VENOUS BLD VENIPUNCTURE: CPT

## 2024-05-24 PROCEDURE — 81003 URINALYSIS AUTO W/O SCOPE: CPT

## 2024-05-24 PROCEDURE — 85025 COMPLETE CBC W/AUTO DIFF WBC: CPT

## 2024-05-24 PROCEDURE — 83918 ORGANIC ACIDS TOTAL QUANT: CPT

## 2024-05-24 PROCEDURE — 83010 ASSAY OF HAPTOGLOBIN QUANT: CPT

## 2024-05-24 PROCEDURE — 83550 IRON BINDING TEST: CPT

## 2024-05-24 RX ORDER — TESTOSTERONE CYPIONATE 200 MG/ML
INJECTION, SOLUTION INTRAMUSCULAR
COMMUNITY
Start: 2024-05-15

## 2024-05-24 NOTE — PROGRESS NOTES
[facility] - Outpatient Follow-up Note  Inna Hill 58 y.o. female MRN: [id number]  Encounter: [id number]          ASSESSMENT AND PLAN:      1. Iron deficiency anemia, unspecified iron deficiency anemia type  EGD/Colonoscopy/VCE without source  SIBO (+) on Xifaxan    F/U iron and CBC    2. Bloating  3. SIBO  She is taking Xifaxan  She continues to report bloating  She notes mild constipation    Finish Xifaxan course  Start probiotic and Colace daily  Briefly discussed dietary manipulation    ______________________________________________________________________    SUBJECTIVE:  58 year old female with a history of migraine headaches, hypothyroidism, anemia who presents for routine follow up.  Due to her anemia after her last appointment she underwent a video capsule endoscopy which was normal.  Because of her symptoms of bloating and anemia she was scheduled for SIBO breath testing which was positive.  She is currently on a Xifaxan course.  She reports that with the Xifaxan her bloating has improved minimally.  She was advised to start a probiotic at her last appointment she has not yet done.  She has picked up a warning but has not taken it consistently.  She reports everything she eats or drinks causes bloating.  She has a specific right lower quadrant pain which she admits improves after a bowel movement.  She has no rectal bleeding.  Colonoscopy in April 2022 with Dr. Castillo was normal.  She recently followed up with her hematologist.  She has been advised to take iron supplements in the past but she absolutely cannot tolerate them as they make her nauseous and constipated.  Consideration was being made to her going on iron infusions however it was noted that recently her hemoglobin normalized and so this was deferred.  Where blood work was ordered but she has not yet had this done.      REVIEW OF SYSTEMS IS OTHERWISE NEGATIVE.      Historical Information   Past Medical History:    Diagnosis Date    Disease of thyroid gland     Fracture of distal end of radius     Migraine      Past Surgical History:   Procedure Laterality Date    EGD AND COLONOSCOPY      WRIST FRACTURE SURGERY Left     Open w hardware     Social History   Social History     Substance and Sexual Activity   Alcohol Use Yes    Alcohol/week: 4.0 - 5.0 standard drinks of alcohol    Types: 4 - 5 Standard drinks or equivalent per week    Comment: social     Social History     Substance and Sexual Activity   Drug Use Never     Social History     Tobacco Use   Smoking Status Every Day    Current packs/day: 1.00    Average packs/day: 1 pack/day for 30.0 years (30.0 ttl pk-yrs)    Types: Cigarettes   Smokeless Tobacco Never     Family History   Problem Relation Age of Onset    Hyperlipidemia Mother     Aortic aneurysm Father     Stroke Father         CVA    Kidney cancer Father     Throat cancer Sister         HPV    Skin cancer Sister     No Known Problems Sister     No Known Problems Sister     No Known Problems Sister     No Known Problems Sister     Multiple sclerosis Maternal Grandmother     Stroke Maternal Grandfather     Multiple sclerosis Maternal Grandfather     Multiple sclerosis Paternal Grandmother     Other Paternal Grandfather         CVA    Kidney disease Family         Benign hypertensive    Other Family         Carcinosarcoma of the renal pelvis, Headache    Heart disease Family     Hiatal hernia Family     Substance Abuse Neg Hx        Meds/Allergies       Current Outpatient Medications:     Fish Oil-Cholecalciferol (Fish Oil + D3) 3316-9231 MG-UNIT CAPS    levothyroxine 125 mcg tablet    liothyronine (Cytomel) 5 mcg tablet    propranolol (INDERAL LA) 120 mg 24 hr capsule    SUMAtriptan (IMITREX) 5 MG/ACT nasal spray    testosterone cypionate (DEPO-TESTOSTERONE) 200 mg/mL SOLN    TESTOSTERONE PROPIONATE IJ    Atogepant (Qulipta) 60 MG TABS    No Known Allergies        Objective     Blood pressure 125/76, pulse 78,  "temperature 97.5 °F (36.4 °C), temperature source Temporal, height 5' 1\" (1.549 m), weight 64.9 kg (143 lb), SpO2 98%, not currently breastfeeding. Body mass index is 27.02 kg/m².      PHYSICAL EXAM:      General Appearance:   Alert, cooperative, no distress   HEENT:   Normocephalic, atraumatic, anicteric.     Neck:  Supple, symmetrical, trachea midline   Lungs:   Clear to auscultation bilaterally; no rales, rhonchi or wheezing; respirations unlabored    Heart::   Regular rate and rhythm; no murmur, rub, or gallop.   Abdomen:   Soft, non-tender, non-distended; normal bowel sounds; no masses, no organomegaly    Genitalia:   Deferred    Rectal:   Deferred    Extremities:  No cyanosis, clubbing or edema    Pulses:  2+ and symmetric    Skin:  No jaundice, rashes, or lesions    Lymph nodes:  No palpable cervical lymphadenopathy        Lab Results:   No visits with results within 1 Day(s) from this visit.   Latest known visit with results is:   Orders Only on 03/28/2024   Component Date Value    Hemoglobin A1C 03/28/2024 5.9          Radiology Results:   No results found.  "

## 2024-05-25 LAB — HAPTOGLOB SERPL-MCNC: 223 MG/DL (ref 33–346)

## 2024-05-28 LAB — METHYLMALONATE SERPL-SCNC: 185 NMOL/L (ref 0–378)

## 2024-05-29 ENCOUNTER — TELEPHONE (OUTPATIENT)
Dept: HEMATOLOGY ONCOLOGY | Facility: CLINIC | Age: 59
End: 2024-05-29

## 2024-05-29 DIAGNOSIS — D50.9 IRON DEFICIENCY ANEMIA, UNSPECIFIED IRON DEFICIENCY ANEMIA TYPE: Primary | ICD-10-CM

## 2024-05-29 DIAGNOSIS — E53.8 B12 DEFICIENCY: ICD-10-CM

## 2024-05-29 NOTE — TELEPHONE ENCOUNTER
Notified patient of lab results.  B12 and iron levels are low.  We discussed oral iron versus IV iron.  Patient prefers oral iron however has had constipation with use in the past.  Recommend to take with stool softener and decrease iron to 3 times a week.  Take with vitamin C to increase absorption.  For B12, recommend 2000 mcg daily x 4 weeks then decrease to 1000 mcg daily.  We will repeat her blood work prior to her follow-up appointment in August.

## 2024-07-29 ENCOUNTER — TELEPHONE (OUTPATIENT)
Dept: HEMATOLOGY ONCOLOGY | Facility: CLINIC | Age: 59
End: 2024-07-29

## 2024-07-29 NOTE — TELEPHONE ENCOUNTER
Called and spoke with pt reminding them that they are to have their labs completed prior to appt on Thursday 8/1. Patient understood instructions and confirmed appt time

## 2024-07-30 ENCOUNTER — APPOINTMENT (OUTPATIENT)
Dept: LAB | Facility: HOSPITAL | Age: 59
End: 2024-07-30
Payer: COMMERCIAL

## 2024-07-30 DIAGNOSIS — G43.909 MIGRAINE WITHOUT STATUS MIGRAINOSUS, NOT INTRACTABLE, UNSPECIFIED MIGRAINE TYPE: ICD-10-CM

## 2024-07-30 DIAGNOSIS — I49.3 PVC (PREMATURE VENTRICULAR CONTRACTION): ICD-10-CM

## 2024-07-30 DIAGNOSIS — E53.8 B12 DEFICIENCY: ICD-10-CM

## 2024-07-30 DIAGNOSIS — D50.9 IRON DEFICIENCY ANEMIA, UNSPECIFIED IRON DEFICIENCY ANEMIA TYPE: ICD-10-CM

## 2024-07-30 LAB
BASOPHILS # BLD AUTO: 0.06 THOUSANDS/ÂΜL (ref 0–0.1)
BASOPHILS NFR BLD AUTO: 1 % (ref 0–1)
EOSINOPHIL # BLD AUTO: 0.27 THOUSAND/ÂΜL (ref 0–0.61)
EOSINOPHIL NFR BLD AUTO: 4 % (ref 0–6)
ERYTHROCYTE [DISTWIDTH] IN BLOOD BY AUTOMATED COUNT: 18.7 % (ref 11.6–15.1)
FERRITIN SERPL-MCNC: 7 NG/ML (ref 11–307)
HCT VFR BLD AUTO: 40.1 % (ref 34.8–46.1)
HGB BLD-MCNC: 12.1 G/DL (ref 11.5–15.4)
IMM GRANULOCYTES # BLD AUTO: 0.02 THOUSAND/UL (ref 0–0.2)
IMM GRANULOCYTES NFR BLD AUTO: 0 % (ref 0–2)
IRON SATN MFR SERPL: 6 % (ref 15–50)
IRON SERPL-MCNC: 28 UG/DL (ref 50–212)
LYMPHOCYTES # BLD AUTO: 3.04 THOUSANDS/ÂΜL (ref 0.6–4.47)
LYMPHOCYTES NFR BLD AUTO: 41 % (ref 14–44)
MCH RBC QN AUTO: 24.3 PG (ref 26.8–34.3)
MCHC RBC AUTO-ENTMCNC: 30.2 G/DL (ref 31.4–37.4)
MCV RBC AUTO: 81 FL (ref 82–98)
MONOCYTES # BLD AUTO: 0.66 THOUSAND/ÂΜL (ref 0.17–1.22)
MONOCYTES NFR BLD AUTO: 9 % (ref 4–12)
NEUTROPHILS # BLD AUTO: 3.42 THOUSANDS/ÂΜL (ref 1.85–7.62)
NEUTS SEG NFR BLD AUTO: 45 % (ref 43–75)
NRBC BLD AUTO-RTO: 0 /100 WBCS
PLATELET # BLD AUTO: 217 THOUSANDS/UL (ref 149–390)
PMV BLD AUTO: 11.2 FL (ref 8.9–12.7)
RBC # BLD AUTO: 4.97 MILLION/UL (ref 3.81–5.12)
TIBC SERPL-MCNC: 458 UG/DL (ref 250–450)
UIBC SERPL-MCNC: 430 UG/DL (ref 155–355)
VIT B12 SERPL-MCNC: 334 PG/ML (ref 180–914)
WBC # BLD AUTO: 7.47 THOUSAND/UL (ref 4.31–10.16)

## 2024-07-30 PROCEDURE — 82728 ASSAY OF FERRITIN: CPT

## 2024-07-30 PROCEDURE — 83540 ASSAY OF IRON: CPT

## 2024-07-30 PROCEDURE — 36415 COLL VENOUS BLD VENIPUNCTURE: CPT

## 2024-07-30 PROCEDURE — 83550 IRON BINDING TEST: CPT

## 2024-07-30 PROCEDURE — 85025 COMPLETE CBC W/AUTO DIFF WBC: CPT

## 2024-07-30 PROCEDURE — 82607 VITAMIN B-12: CPT

## 2024-07-30 RX ORDER — PROPRANOLOL HYDROCHLORIDE 120 MG/1
120 CAPSULE, EXTENDED RELEASE ORAL DAILY
Qty: 90 CAPSULE | Refills: 1 | Status: SHIPPED | OUTPATIENT
Start: 2024-07-30

## 2024-08-01 ENCOUNTER — OFFICE VISIT (OUTPATIENT)
Dept: HEMATOLOGY ONCOLOGY | Facility: CLINIC | Age: 59
End: 2024-08-01
Payer: COMMERCIAL

## 2024-08-01 VITALS
HEIGHT: 61 IN | BODY MASS INDEX: 27.19 KG/M2 | SYSTOLIC BLOOD PRESSURE: 118 MMHG | DIASTOLIC BLOOD PRESSURE: 70 MMHG | OXYGEN SATURATION: 99 % | HEART RATE: 72 BPM | WEIGHT: 144 LBS | RESPIRATION RATE: 16 BRPM | TEMPERATURE: 98.1 F

## 2024-08-01 DIAGNOSIS — E53.8 B12 DEFICIENCY: ICD-10-CM

## 2024-08-01 DIAGNOSIS — D50.8 IRON DEFICIENCY ANEMIA SECONDARY TO INADEQUATE DIETARY IRON INTAKE: Primary | ICD-10-CM

## 2024-08-01 PROCEDURE — 99213 OFFICE O/P EST LOW 20 MIN: CPT | Performed by: PHYSICIAN ASSISTANT

## 2024-08-01 NOTE — PROGRESS NOTES
[facility]ATOLOGY ONCOLOGY SPECIALISTS [address] [address]  Hematology Ambulatory Follow-Up  Inna Hill, 1965, [id number]  8/1/2024      Assessment and Plan   1. Iron deficiency anemia secondary to inadequate dietary iron intake  - CBC and differential; Future  - Iron Panel (Includes Ferritin, Iron Sat%, Iron, and TIBC); Future  2. B12 deficiency  - Vitamin B12; Future    48-year-old female who is seen in follow up today for iron deficiency anemia, b12 deficiency .  This has been present since at least 2022.  Prior GI workup showed hemorrhoids, otherwise was unrevealing including recent VCE.  She has been diagnosed with SIBO completed Xifaxan therapy.  We have discussed IV iron in the past however patient preferred to continue oral iron supplementation.  Patient admits that she has not been taking oral iron consistently.  She has been taking her B12 however.  Hgb 12.1, Ferritin 7. B12 334.  Remainder of CBC is normal.  She has intermittent hemorrhoidal bleeding, none recently.  She does have some intermittent constipation.  She has 1 meal per day.  She is up-to-date on colonoscopy however she is not up-to-date on other age related cancer screening (ie breast cancer, lung cancer screening).     Discussion/Plan   Her prior anemia is resolved however she has persistently low ferritin stores which is likely related to intermittent hemorrhoidal blood loss and poor diet.  We rediscussed oral iron versus IV iron.  Patient would like to continue oral iron supplementation for now with plans to take it more consistently.  Advised to increase dietary oral iron intake.  Patient provided handout on iron rich diet.  We will continue to monitor her CBC, iron panel every 3 months.  She was advised to notify our office if she has any symptoms of anemia including shortness of breath, chest pain, lightheadedness, dizziness or if she experiences any  "bleeding.  Continue B12 supplementation  Recommend to pursue age-related cancer screenings.   RTC 3 months    Patient voiced agreement and understanding to the above.   Patient advised to call the Hematology/Oncology office with any questions and concerns regarding the above.    Barrier(s) to care: None  The patient is able to self care.    Mel Bhat PA-C   Medical Oncology/Hematology  [facility]    Subjective     Chief Complaint   Patient presents with    Follow-up     3 month follow-up       History of present illness: 58-year-old female with history of hypothyroidism who presents in follow up today for evaluation of iron deficiency anemia, b12 deficiency.     KATHY   Anemia first identified in 2022.  Labs were consistent with iron deficiency anemia with ferritin of 4.  She had endoscopy/colonoscopy which showed hemorrhoids, otherwise were unrevealing. Biopsies were negative for celiac's disease. She was supposed to have she was recommended to have VCE however it never had this performed.  Hemorrhoidectomy was recommended due to heavy bleeding from hemorrhoids however this was not pursued by patient.      Most recent labs from 09/2023-> WBC 5.9, hemoglobin 9.7, MCV 78, RDI 17 platelets 208,000.  WBC differential is normal.  UA negative for hematuria.  B12 356.  TSH 4.6.     She is still having hemorrhoidal bleeding after strenuous bowel movements. Will have \"dripping\" BRBPR occasionally with clots. Has to wear pads when this occurs. No longer menstruating for > 5 years. No melena. She was on oral iron daily from 09/2023 stopped taking after 3-4 months. Stopped taking due to side effect of constipation despite increased fiber/fluid intake and OTC laxatives. She was recently diagnosed with SIBO, planning to begin Xifaxan. She is planning to start this today.  Complains of pica and RLQ pain x 2 weeks. Pain in sharp at times. Had some left sided back pain now resolved. No dysuria.  " Denies shortness of breath, chest pain, lightheadedness or dizziness.  Consumes well-rounded diet.  No history of bariatric surgery or PPI use. Last mammogram 03/2022 which was normal. Smokes 1ppd x 35 years. Social alcohol use. No drug use. No personal hx of cancer  - sister has head and neck cancer (smoker). No family history of anemia.      03/2022: WBC 8.63, hgb 9.3, MCV 75, PLT 233k diff normal. CMP unremarkable. Iron sat 4%, ferritin 4, TIBC elevated. She underwent endoscopy/colonospy which showed hemorrhoids, otherwise normal. Duodenal biopsy negative for malabsorptive enteropathy and malignancy.      09/2023-> WBC 5.9, hemoglobin 0.7, MCV 78, RDI 17 platelets 208,000.  WBC differential is normal.  UA negative for hematuria.  B12 356.  TSH 4.6.     04/2024: VCE wo evidence of bleeding      05/2024: Ferritin 5    07/2024: Hgb 12.1, MCV 81, ,000.  WBC and differential are normal.  Ferritin 7.    2. B12 deficiency   05/2024: 155  07/2024 334    08/01/24 :  Lab Results   Component Value Date    IRON 28 (L) 07/30/2024    TIBC 458 (H) 07/30/2024    FERRITIN 7 (L) 07/30/2024     Lab Results   Component Value Date    WBC 7.47 07/30/2024    HGB 12.1 07/30/2024    HCT 40.1 07/30/2024    MCV 81 (L) 07/30/2024     07/30/2024       Interval history: Has been taking B12 every day however has not been taking her oral iron supplement consistently.  She only takes it when she remembers.  She does have issues with intermittent hemorrhoidal bleeding and constipation.  No other bleeding.  Has occasional dizziness.  No chest pain, shortness of breath, fatigue or difficulty with memory.    Review of Systems   All other systems reviewed and are negative.      Patient Active Problem List   Diagnosis    Hypothyroidism, adult    Migraine    PVC (premature ventricular contraction)    Vitamin D deficiency    Tobacco use disorder    Iron deficiency anemia    Mixed hyperlipidemia    B12 deficiency     Past Medical History:    Diagnosis Date    Disease of thyroid gland     Fracture of distal end of radius     Migraine      Past Surgical History:   Procedure Laterality Date    EGD AND COLONOSCOPY      WRIST FRACTURE SURGERY Left     Open w hardware     Family History   Problem Relation Age of Onset    Hyperlipidemia Mother     Aortic aneurysm Father     Stroke Father         CVA    Kidney cancer Father     Throat cancer Sister         HPV    Skin cancer Sister     No Known Problems Sister     No Known Problems Sister     No Known Problems Sister     No Known Problems Sister     Multiple sclerosis Maternal Grandmother     Stroke Maternal Grandfather     Multiple sclerosis Maternal Grandfather     Multiple sclerosis Paternal Grandmother     Other Paternal Grandfather         CVA    Kidney disease Family         Benign hypertensive    Other Family         Carcinosarcoma of the renal pelvis, Headache    Heart disease Family     Hiatal hernia Family     Substance Abuse Neg Hx      Social History     Socioeconomic History    Marital status: /Civil Union     Spouse name: None    Number of children: 2    Years of education: None    Highest education level: None   Occupational History    Occupation:    Tobacco Use    Smoking status: Every Day     Current packs/day: 1.00     Average packs/day: 1 pack/day for 30.0 years (30.0 ttl pk-yrs)     Types: Cigarettes    Smokeless tobacco: Never   Vaping Use    Vaping status: Never Used   Substance and Sexual Activity    Alcohol use: Yes     Alcohol/week: 4.0 - 5.0 standard drinks of alcohol     Types: 4 - 5 Standard drinks or equivalent per week     Comment: social    Drug use: Never    Sexual activity: Not Currently     Partners: Male     Birth control/protection: None   Other Topics Concern    None   Social History Narrative        2 children    Exercises regularly-walking, weight training    Activities-Boating    Dental care regularly    Brushes teeth twice daily    Caffeine use  "   Employed-realtor, part-time [employer]     Social Determinants of Health     Financial Resource Strain: Not on file   Food Insecurity: Not on file   Transportation Needs: Not on file   Physical Activity: Not on file   Stress: Not on file   Social Connections: Not on file   Intimate Partner Violence: Not on file   Housing Stability: Not on file       Current Outpatient Medications:     Fish Oil-Cholecalciferol (Fish Oil + D3) 2927-3781 MG-UNIT CAPS, Take by mouth Twice  daily, Disp: , Rfl:     levothyroxine 125 mcg tablet, take 1 tablet by mouth once daily, Disp: 90 tablet, Rfl: 1    liothyronine (Cytomel) 5 mcg tablet, Take 5 mcg by mouth 2 (two) times a day, Disp: , Rfl:     propranolol (INDERAL LA) 120 mg 24 hr capsule, TAKE ONE CAPSULE BY MOUTH EVERY DAY, Disp: 90 capsule, Rfl: 1    SUMAtriptan (IMITREX) 5 MG/ACT nasal spray, Use 1 spray each nostril twice a day as needed for migraine, Disp: 6 each, Rfl: 1    testosterone cypionate (DEPO-TESTOSTERONE) 200 mg/mL SOLN, INJECT 0.1ML UNDER THE SKIN ONCE EVERY 3 WEEKS, Disp: , Rfl:     TESTOSTERONE PROPIONATE IJ, Inject 0.1 mL as directed EVERY 3 WEEKS PER PATIENT 0.1 MIL INJECTION, Disp: , Rfl:     Atogepant (Qulipta) 60 MG TABS, Take by mouth (Patient not taking: Reported on 5/1/2024), Disp: , Rfl:   No Known Allergies    Objective   /70   Pulse 72   Temp 98.1 °F (36.7 °C) (Temporal)   Resp 16   Ht 5' 1\" (1.549 m)   Wt 65.3 kg (144 lb)   SpO2 99%   BMI 27.21 kg/m²    Physical Exam  Vitals reviewed.   HENT:      Head: Normocephalic.   Cardiovascular:      Rate and Rhythm: Normal rate.   Pulmonary:      Effort: Pulmonary effort is normal.   Musculoskeletal:      Cervical back: Neck supple.   Skin:     Findings: No rash.   Neurological:      Mental Status: She is alert.         Result Review  Labs:  Appointment on 07/30/2024   Component Date Value Ref Range Status    Vitamin B-12 07/30/2024 334  180 - 914 pg/mL Final    WBC 07/30/2024 7.47  4.31 - 10.16 " Thousand/uL Final    RBC 07/30/2024 4.97  3.81 - 5.12 Million/uL Final    Hemoglobin 07/30/2024 12.1  11.5 - 15.4 g/dL Final    Hematocrit 07/30/2024 40.1  34.8 - 46.1 % Final    MCV 07/30/2024 81 (L)  82 - 98 fL Final    MCH 07/30/2024 24.3 (L)  26.8 - 34.3 pg Final    MCHC 07/30/2024 30.2 (L)  31.4 - 37.4 g/dL Final    RDW 07/30/2024 18.7 (H)  11.6 - 15.1 % Final    MPV 07/30/2024 11.2  8.9 - 12.7 fL Final    Platelets 07/30/2024 217  149 - 390 Thousands/uL Final    nRBC 07/30/2024 0  /100 WBCs Final    Segmented % 07/30/2024 45  43 - 75 % Final    Immature Grans % 07/30/2024 0  0 - 2 % Final    Lymphocytes % 07/30/2024 41  14 - 44 % Final    Monocytes % 07/30/2024 9  4 - 12 % Final    Eosinophils Relative 07/30/2024 4  0 - 6 % Final    Basophils Relative 07/30/2024 1  0 - 1 % Final    Absolute Neutrophils 07/30/2024 3.42  1.85 - 7.62 Thousands/µL Final    Absolute Immature Grans 07/30/2024 0.02  0.00 - 0.20 Thousand/uL Final    Absolute Lymphocytes 07/30/2024 3.04  0.60 - 4.47 Thousands/µL Final    Absolute Monocytes 07/30/2024 0.66  0.17 - 1.22 Thousand/µL Final    Eosinophils Absolute 07/30/2024 0.27  0.00 - 0.61 Thousand/µL Final    Basophils Absolute 07/30/2024 0.06  0.00 - 0.10 Thousands/µL Final    Iron Saturation 07/30/2024 6 (L)  15 - 50 % Final    TIBC 07/30/2024 458 (H)  250 - 450 ug/dL Final    Iron 07/30/2024 28 (L)  50 - 212 ug/dL Final    Patients treated with metal-binding drugs (ie. Deferoxamine) may have depressed iron values.    UIBC 07/30/2024 430 (H)  155 - 355 ug/dL Final    Ferritin 07/30/2024 7 (L)  11 - 307 ng/mL Final       Imaging:   I reviewed relevant imaging    Please note:  This report has been generated by a voice recognition software system. Therefore there may be syntax, spelling, and/or grammatical errors. Please call if you have any questions.

## 2024-08-09 ENCOUNTER — VBI (OUTPATIENT)
Dept: ADMINISTRATIVE | Facility: OTHER | Age: 59
End: 2024-08-09

## 2024-08-09 NOTE — TELEPHONE ENCOUNTER
08/09/24 9:27 AM     Chart reviewed for Mammogram was/were not submitted to the patient's insurance.     Chrissy Rios MA   [facility]

## 2024-08-13 ENCOUNTER — TELEPHONE (OUTPATIENT)
Age: 59
End: 2024-08-13

## 2024-08-13 NOTE — TELEPHONE ENCOUNTER
Pt calling for NP appt for SOC and skin check    Offered next avail, pt declined wants to be seen sooner. Advised having her PCP check soc in meantime

## 2024-10-09 LAB — HBA1C MFR BLD HPLC: 5.9 %

## 2024-10-30 ENCOUNTER — TELEPHONE (OUTPATIENT)
Dept: HEMATOLOGY ONCOLOGY | Facility: CLINIC | Age: 59
End: 2024-10-30

## 2024-10-30 NOTE — TELEPHONE ENCOUNTER
LMOM susanna Keith regarding lab work that needs to get done. Provided hope line # in case pt needs to r/s

## 2024-12-13 ENCOUNTER — VBI (OUTPATIENT)
Dept: ADMINISTRATIVE | Facility: OTHER | Age: 59
End: 2024-12-13

## 2024-12-13 NOTE — TELEPHONE ENCOUNTER
12/13/24 3:27 PM     Chart reviewed for Mammogram was/were not submitted to the patient's insurance.     Chrissy Rios MA   [facility]

## 2025-01-27 DIAGNOSIS — G43.909 MIGRAINE WITHOUT STATUS MIGRAINOSUS, NOT INTRACTABLE, UNSPECIFIED MIGRAINE TYPE: ICD-10-CM

## 2025-01-27 DIAGNOSIS — I49.3 PVC (PREMATURE VENTRICULAR CONTRACTION): ICD-10-CM

## 2025-01-27 RX ORDER — PROPRANOLOL HYDROCHLORIDE 120 MG/1
120 CAPSULE, EXTENDED RELEASE ORAL DAILY
Qty: 90 CAPSULE | Refills: 0 | Status: SHIPPED | OUTPATIENT
Start: 2025-01-27

## 2025-03-11 ENCOUNTER — TELEPHONE (OUTPATIENT)
Dept: INTERNAL MEDICINE CLINIC | Facility: CLINIC | Age: 60
End: 2025-03-11

## 2025-03-11 ENCOUNTER — OFFICE VISIT (OUTPATIENT)
Dept: INTERNAL MEDICINE CLINIC | Facility: CLINIC | Age: 60
End: 2025-03-11
Payer: COMMERCIAL

## 2025-03-11 VITALS
HEART RATE: 68 BPM | DIASTOLIC BLOOD PRESSURE: 66 MMHG | HEIGHT: 61 IN | WEIGHT: 147.8 LBS | SYSTOLIC BLOOD PRESSURE: 132 MMHG | BODY MASS INDEX: 27.9 KG/M2 | RESPIRATION RATE: 16 BRPM | OXYGEN SATURATION: 97 %

## 2025-03-11 DIAGNOSIS — Z78.0 POST-MENOPAUSAL: ICD-10-CM

## 2025-03-11 DIAGNOSIS — K59.00 CONSTIPATION, UNSPECIFIED CONSTIPATION TYPE: ICD-10-CM

## 2025-03-11 DIAGNOSIS — Z00.00 HEALTHCARE MAINTENANCE: ICD-10-CM

## 2025-03-11 DIAGNOSIS — F17.200 TOBACCO USE DISORDER: ICD-10-CM

## 2025-03-11 DIAGNOSIS — C44.92 SQUAMOUS CELL CARCINOMA OF SKIN: ICD-10-CM

## 2025-03-11 DIAGNOSIS — D50.9 IRON DEFICIENCY ANEMIA, UNSPECIFIED IRON DEFICIENCY ANEMIA TYPE: ICD-10-CM

## 2025-03-11 DIAGNOSIS — Z00.00 ANNUAL PHYSICAL EXAM: Primary | ICD-10-CM

## 2025-03-11 DIAGNOSIS — E53.8 B12 DEFICIENCY: ICD-10-CM

## 2025-03-11 DIAGNOSIS — E03.9 HYPOTHYROIDISM, ADULT: ICD-10-CM

## 2025-03-11 DIAGNOSIS — E55.9 VITAMIN D DEFICIENCY: ICD-10-CM

## 2025-03-11 DIAGNOSIS — Z12.31 ENCOUNTER FOR SCREENING MAMMOGRAM FOR MALIGNANT NEOPLASM OF BREAST: ICD-10-CM

## 2025-03-11 PROCEDURE — 99396 PREV VISIT EST AGE 40-64: CPT | Performed by: PHYSICIAN ASSISTANT

## 2025-03-11 RX ORDER — HYDROQUINONE 40 MG/G
CREAM TOPICAL
COMMUNITY
Start: 2025-03-11

## 2025-03-11 NOTE — PROGRESS NOTES
Adult Annual Physical  Name: Inna Hill      : 1965      MRN: [id number]  Encounter Provider: Fernando Hernandes PA-C  Encounter Date: 3/11/2025   Encounter department: [facility]    Assessment & Plan  Annual physical exam         Healthcare maintenance    Orders:    Ambulatory referral to Obstetrics / Gynecology; Future    Tobacco use disorder    Orders:    CT lung screening program; Future    Encounter for screening mammogram for malignant neoplasm of breast    Orders:    Mammo screening bilateral w 3d and cad; Future    Post-menopausal    Orders:    DXA bone density spine hip and pelvis; Future    Constipation, unspecified constipation type    Orders:    Ambulatory referral to Gastroenterology; Future    Hypothyroidism, adult   Followed by specialty.         B12 deficiency  Following with hematology on B12 Gummies.  Most recent B12 level acceptable.         Iron deficiency anemia, unspecified iron deficiency anemia type  Following with hematology         Vitamin D deficiency  Most recent D level is in acceptable range.           Preventive Screenings:  - Diabetes Screening: screening up-to-date  - Cholesterol Screening: has hyperlipidemia and screening up-to-date   - Hepatitis C screening: screening up-to-date   - HIV screening: screening up-to-date   - Cervical cancer screening: screening up-to-date   - Breast cancer screening: orders placed   - Colon cancer screening: screening up-to-date   - Lung cancer screening: orders placed     Immunizations:  - Immunizations due: Influenza, Prevnar 20, Tdap and Zoster (Shingrix)      Depression Screening and Follow-up Plan: Patient was screened for depression during today's encounter. They screened negative with a PHQ-2 score of 0.      Tobacco Cessation Counseling: Tobacco cessation counseling was provided. The patient is sincerely urged to quit consumption of tobacco. She is not ready to quit tobacco.         History of Present  Illness     Adult Annual Physical:  Patient presents for annual physical. 59-year-old female presents for her annual physical.  Overall she feels well.  She is following with specialty for her hypothyroidism.  Will need follow-up for IBS with constipation with GI.  Will need referral to gynecology for routine exam.  Will need mammogram.  Will refer for bone density.  Also patient is a long-term smoker and will need to do low-dose CT scanning.    EMR indicates that GI had treated her with Xifaxan for small bowel infection.    EMR has been reviewed, clarified, and updated with patient today..     Diet and Physical Activity:  - Diet/Nutrition: consuming 3-5 servings of fruits/vegetables daily, well balanced diet, no special diet and limited junk food.  - Exercise: strength training exercises, moderate cardiovascular exercise and 3-4 times a week on average.    Depression Screening:  - PHQ-2 Score: 0    General Health:  - Sleep: sleeps poorly and 4-6 hours of sleep on average.  - Hearing: normal hearing bilateral ears.  - Vision: wears glasses and most recent eye exam > 1 year ago.  - Dental: regular dental visits and brushes teeth twice daily.    /GYN Health:  - Follows with GYN: yes.   - Menopause: postmenopausal.   - History of STDs: no    Advanced Care Planning:  - Has an advanced directive?: no    - Has a durable medical POA?: no    - ACP document given to patient?: no      Review of Systems   Constitutional:  Negative for activity change, chills, fatigue and fever.   HENT:  Negative for congestion.    Eyes:  Positive for visual disturbance (Wears glasses). Negative for discharge.   Respiratory:  Negative for cough, chest tightness and shortness of breath.    Cardiovascular:  Negative for chest pain, palpitations and leg swelling.   Gastrointestinal:  Negative for abdominal pain, blood in stool, constipation, diarrhea, nausea and vomiting.   Endocrine: Negative for polydipsia, polyphagia and polyuria.    Genitourinary:  Negative for difficulty urinating.   Musculoskeletal:  Negative for arthralgias and myalgias.   Skin:  Negative for rash.   Allergic/Immunologic: Negative for immunocompromised state.   Neurological:  Negative for dizziness, syncope, weakness, light-headedness and headaches.   Hematological:  Negative for adenopathy. Does not bruise/bleed easily.   Psychiatric/Behavioral:  Negative for dysphoric mood, sleep disturbance and suicidal ideas. The patient is not nervous/anxious.      Medical History Reviewed by provider this encounter:  Tobacco  Allergies  Meds  Problems  Med Hx  Surg Hx  Fam Hx     .  Current Outpatient Medications on File Prior to Visit   Medication Sig Dispense Refill    Fish Oil-Cholecalciferol (Fish Oil + D3) 7493-8497 MG-UNIT CAPS Take by mouth Twice  daily      hydroquinone 4 % cream       levothyroxine 125 mcg tablet take 1 tablet by mouth once daily 90 tablet 1    liothyronine (Cytomel) 5 mcg tablet Take 5 mcg by mouth 2 (two) times a day      propranolol (INDERAL LA) 120 mg 24 hr capsule TAKE ONE CAPSULE BY MOUTH EVERY DAY 90 capsule 0    SUMAtriptan (IMITREX) 5 MG/ACT nasal spray Use 1 spray each nostril twice a day as needed for migraine 6 each 1    testosterone cypionate (DEPO-TESTOSTERONE) 200 mg/mL SOLN INJECT 0.1ML UNDER THE SKIN ONCE EVERY 3 WEEKS      Atogepant (Qulipta) 60 MG TABS Take by mouth (Patient not taking: Reported on 5/1/2024)      TESTOSTERONE PROPIONATE IJ Inject 0.1 mL as directed EVERY 3 WEEKS PER PATIENT 0.1 MIL INJECTION (Patient not taking: Reported on 3/11/2025)       No current facility-administered medications on file prior to visit.      Social History     Tobacco Use    Smoking status: Every Day     Current packs/day: 1.00     Average packs/day: 1 pack/day for 30.0 years (30.0 ttl pk-yrs)     Types: Cigarettes    Smokeless tobacco: Never   Vaping Use    Vaping status: Never Used   Substance and Sexual Activity    Alcohol use: Yes      "Alcohol/week: 4.0 - 5.0 standard drinks of alcohol     Types: 4 - 5 Standard drinks or equivalent per week     Comment: social    Drug use: Never    Sexual activity: Not Currently     Partners: Male     Birth control/protection: None       Objective   /66 (BP Location: Left arm, Patient Position: Sitting, Cuff Size: Standard)   Pulse 68   Resp 16   Ht 5' 1\" (1.549 m)   Wt 67 kg (147 lb 12.8 oz)   SpO2 97%   BMI 27.93 kg/m²     Physical Exam  Vitals and nursing note reviewed.   Constitutional:       General: She is not in acute distress.     Appearance: She is well-developed. She is not ill-appearing.      Comments: Well-developed, well-nourished 59-year-old female appearing about stated age in no acute distress.   HENT:      Head: Normocephalic and atraumatic.      Right Ear: Tympanic membrane, ear canal and external ear normal.      Left Ear: Tympanic membrane, ear canal and external ear normal.      Nose: Nose normal.      Mouth/Throat:      Mouth: Mucous membranes are moist.      Pharynx: Oropharynx is clear. No oropharyngeal exudate.   Eyes:      Extraocular Movements: Extraocular movements intact.      Conjunctiva/sclera: Conjunctivae normal.      Pupils: Pupils are equal, round, and reactive to light.   Neck:      Thyroid: No thyromegaly.      Vascular: No carotid bruit or JVD.   Cardiovascular:      Rate and Rhythm: Normal rate and regular rhythm.      Heart sounds: Normal heart sounds. No murmur heard.  Pulmonary:      Effort: Pulmonary effort is normal.      Breath sounds: Normal breath sounds.      Comments: Slight decreased of breath sounds throughout.  Chest:      Chest wall: No tenderness.   Abdominal:      General: Bowel sounds are normal.      Palpations: Abdomen is soft. There is no hepatomegaly, splenomegaly or mass.      Tenderness: There is no abdominal tenderness. There is no right CVA tenderness or left CVA tenderness.   Musculoskeletal:         General: No swelling or tenderness. " Normal range of motion.      Cervical back: Normal range of motion and neck supple.      Right lower leg: No edema.      Left lower leg: No edema.   Lymphadenopathy:      Cervical: No cervical adenopathy.   Skin:     General: Skin is warm and dry.      Findings: No rash.   Neurological:      General: No focal deficit present.      Mental Status: She is alert and oriented to person, place, and time. Mental status is at baseline.      Cranial Nerves: No cranial nerve deficit.      Sensory: No sensory deficit.      Motor: No weakness.      Coordination: Coordination normal.      Gait: Gait normal.      Deep Tendon Reflexes: Reflexes normal.   Psychiatric:         Mood and Affect: Mood normal.         Behavior: Behavior normal.         Thought Content: Thought content normal.         Judgment: Judgment normal.

## 2025-03-11 NOTE — PATIENT INSTRUCTIONS
"General Medical exam is good.  Continue current medications and follow-up with specialist.  Will refer you back to GI for evaluation of ongoing constipation which I suspect is related to irritable bowel.  Have bone density done, mammogram done, low-dose CT of the chest done and I will communicate results to you when available.  Have your specialist send copies of your labs when you have them done.  Follow-up 1 year, sooner as needed.            Patient Education     Routine physical for adults   The Basics   Written by the doctors and editors at [facility]   What is a physical? -- A physical is a routine visit, or \"check-up,\" with your doctor. You might also hear it called a \"wellness visit\" or \"preventive visit.\"  During each visit, the doctor will:   Ask about your physical and mental health   Ask about your habits, behaviors, and lifestyle   Do an exam   Give you vaccines if needed   Talk to you about any medicines you take   Give advice about your health   Answer your questions  Getting regular check-ups is an important part of taking care of your health. It can help your doctor find and treat any problems you have. But it's also important for preventing health problems.  A routine physical is different from a \"sick visit.\" A sick visit is when you see a doctor because of a health concern or problem. Since physicals are scheduled ahead of time, you can think about what you want to ask the doctor.  How often should I get a physical? -- It depends on your age and health. In general, for people age 21 years and older:   If you are younger than 50 years, you might be able to get a physical every 3 years.   If you are 50 years or older, your doctor might recommend a physical every year.  If you have an ongoing health condition, like diabetes or high blood pressure, your doctor will probably want to see you more often.  What happens during a physical? -- In general, each visit will include:   Physical exam - The doctor " "or nurse will check your height, weight, heart rate, and blood pressure. They will also look at your eyes and ears. They will ask about how you are feeling and whether you have any symptoms that bother you.   Medicines - It's a good idea to bring a list of all the medicines you take to each doctor visit. Your doctor will talk to you about your medicines and answer any questions. Tell them if you are having any side effects that bother you. You should also tell them if you are having trouble paying for any of your medicines.   Habits and behaviors - This includes:   Your diet   Your exercise habits   Whether you smoke, drink alcohol, or use drugs   Whether you are sexually active   Whether you feel safe at home  Your doctor will talk to you about things you can do to improve your health and lower your risk of health problems. They will also offer help and support. For example, if you want to quit smoking, they can give you advice and might prescribe medicines. If you want to improve your diet or get more physical activity, they can help you with this, too.   Lab tests, if needed - The tests you get will depend on your age and situation. For example, your doctor might want to check your:   Cholesterol   Blood sugar   Iron level   Vaccines - The recommended vaccines will depend on your age, health, and what vaccines you already had. Vaccines are very important because they can prevent certain serious or deadly infections.   Discussion of screening - \"Screening\" means checking for diseases or other health problems before they cause symptoms. Your doctor can recommend screening based on your age, risk, and preferences. This might include tests to check for:   Cancer, such as breast, prostate, cervical, ovarian, colorectal, prostate, lung, or skin cancer   Sexually transmitted infections, such as chlamydia and gonorrhea   Mental health conditions like depression and anxiety  Your doctor will talk to you about the " different types of screening tests. They can help you decide which screenings to have. They can also explain what the results might mean.   Answering questions - The physical is a good time to ask the doctor or nurse questions about your health. If needed, they can refer you to other doctors or specialists, too.  Adults older than 65 years often need other care, too. As you get older, your doctor will talk to you about:   How to prevent falling at home   Hearing or vision tests   Memory testing   How to take your medicines safely   Making sure that you have the help and support you need at home  All topics are updated as new evidence becomes available and our peer review process is complete.  This topic retrieved from [employer] on: May 02, 2024.  Topic 392921 Version 1.0  Release: 32.4.3 - C32.122  © 2024 [employer], Inc. and/or its affiliates. All rights reserved.  Consumer Information Use and Disclaimer   Disclaimer: This generalized information is a limited summary of diagnosis, treatment, and/or medication information. It is not meant to be comprehensive and should be used as a tool to help the user understand and/or assess potential diagnostic and treatment options. It does NOT include all information about conditions, treatments, medications, side effects, or risks that may apply to a specific patient. It is not intended to be medical advice or a substitute for the medical advice, diagnosis, or treatment of a health care provider based on the health care provider's examination and assessment of a patient's specific and unique circumstances. Patients must speak with a health care provider for complete information about their health, medical questions, and treatment options, including any risks or benefits regarding use of medications. This information does not endorse any treatments or medications as safe, effective, or approved for treating a specific patient. Up[employer], Inc. and its affiliates disclaim any  warranty or liability relating to this information or the use thereof.The use of this information is governed by the Terms of Use, available at https://www.wolterskluwer.com/en/know/clinical-effectiveness-terms. 2024© [employer] and its affiliates and/or licensors. All rights reserved.  Copyright   © 2024 [employer] and/or its affiliates. All rights reserved.

## 2025-03-11 NOTE — TELEPHONE ENCOUNTER
Called placed to [facility] to obtain lab results ordered by Dr. Saúl Gonzalez. Labs completed October of 2024. Spoke with Darrell who will fax the results.

## 2025-03-14 ENCOUNTER — TELEPHONE (OUTPATIENT)
Age: 60
End: 2025-03-14

## 2025-04-08 ENCOUNTER — ANNUAL EXAM (OUTPATIENT)
Dept: OBGYN CLINIC | Facility: CLINIC | Age: 60
End: 2025-04-08
Payer: COMMERCIAL

## 2025-04-08 VITALS
BODY MASS INDEX: 27.98 KG/M2 | WEIGHT: 148.2 LBS | DIASTOLIC BLOOD PRESSURE: 84 MMHG | SYSTOLIC BLOOD PRESSURE: 142 MMHG | HEIGHT: 61 IN

## 2025-04-08 DIAGNOSIS — Z00.00 HEALTHCARE MAINTENANCE: ICD-10-CM

## 2025-04-08 DIAGNOSIS — Z01.419 ENCOUNTER FOR GYNECOLOGICAL EXAMINATION: Primary | ICD-10-CM

## 2025-04-08 PROCEDURE — S0612 ANNUAL GYNECOLOGICAL EXAMINA: HCPCS | Performed by: OBSTETRICS & GYNECOLOGY

## 2025-04-08 NOTE — PROGRESS NOTES
Name: Inna Hill      : 1965      MRN: [id number]  Encounter Provider: Tamera Villanueva MD  Encounter Date: 2025   Encounter department: [facility] [place]  :  Assessment & Plan  Encounter for gynecological examination  Pap/HPV current  Mammogram ordered  Colonoscopy current    Encourage healthy diet, exercise, Calcium 1200mg per day and at least 800 iu Vitamin D daily.         Healthcare maintenance    Orders:    Ambulatory referral to Obstetrics / Gynecology        History of Present Illness     Realtor, hectic schedule    Motivated to eat healthy, exercise      Gynecologic Exam  She reports no genital itching, genital lesions, genital odor, genital rash, pelvic pain, vaginal bleeding or vaginal discharge. The patient is experiencing no pain. Pertinent negatives include no chills, constipation, diarrhea, fever, nausea, sore throat or vomiting.     Inna Hill is a 59 y.o. female who presents or a routine annual visit    Menarche-  Last Pap Smear- 22 neg/neg    Mammogram- 3/28/22. New order already in chart  Colonoscopy-22 recall 10 years  Every day smoker  Social drinker    Currently not sexually active  No family history of uterine, ovarian, cervical or breast cancer    No concerns/questions for today's visit      Review of Systems   Constitutional:  Negative for activity change, appetite change, chills, fatigue and fever.   HENT:  Negative for rhinorrhea, sneezing and sore throat.    Eyes:  Negative for visual disturbance.   Respiratory:  Negative for cough, shortness of breath and wheezing.    Cardiovascular:  Negative for chest pain, palpitations and leg swelling.   Gastrointestinal:  Negative for abdominal distention, constipation, diarrhea, nausea and vomiting.   Genitourinary:  Negative for difficulty urinating, pelvic pain and vaginal discharge.   Neurological:  Negative for syncope and light-headedness.         "  Objective   /84 (BP Location: Left arm, Patient Position: Sitting, Cuff Size: Standard)   Ht 5' 1\" (1.549 m)   Wt 67.2 kg (148 lb 3.2 oz)   BMI 28.00 kg/m²      Physical Exam  Chest:   Breasts:     Breasts are symmetrical.      Right: No inverted nipple, mass, nipple discharge, skin change or tenderness.      Left: No inverted nipple, mass, nipple discharge, skin change or tenderness.   Genitourinary:     Labia:         Right: No rash, tenderness, lesion or injury.         Left: No rash, tenderness, lesion or injury.       Vagina: Normal. No vaginal discharge, erythema, tenderness or bleeding.      Cervix: No cervical motion tenderness, discharge, friability, erythema or cervical bleeding.      Uterus: Not deviated, not enlarged, not fixed and not tender.       Adnexa:         Right: No mass, tenderness or fullness.          Left: No mass, tenderness or fullness.     Neurological:      Mental Status: She is alert and oriented to person, place, and time.           "

## 2025-04-17 ENCOUNTER — OFFICE VISIT (OUTPATIENT)
Age: 60
End: 2025-04-17
Payer: COMMERCIAL

## 2025-04-17 VITALS
SYSTOLIC BLOOD PRESSURE: 140 MMHG | DIASTOLIC BLOOD PRESSURE: 74 MMHG | WEIGHT: 148.4 LBS | HEART RATE: 67 BPM | OXYGEN SATURATION: 96 % | BODY MASS INDEX: 28.02 KG/M2 | HEIGHT: 61 IN

## 2025-04-17 DIAGNOSIS — R14.0 BLOATING: Primary | ICD-10-CM

## 2025-04-17 DIAGNOSIS — K59.00 CONSTIPATION, UNSPECIFIED CONSTIPATION TYPE: ICD-10-CM

## 2025-04-17 DIAGNOSIS — R19.8 ALTERNATING CONSTIPATION AND DIARRHEA: ICD-10-CM

## 2025-04-17 PROCEDURE — 99213 OFFICE O/P EST LOW 20 MIN: CPT | Performed by: INTERNAL MEDICINE

## 2025-04-17 RX ORDER — DICYCLOMINE HCL 20 MG
20 TABLET ORAL 4 TIMES DAILY PRN
Qty: 60 TABLET | Refills: 5 | Status: SHIPPED | OUTPATIENT
Start: 2025-04-17

## 2025-04-17 NOTE — PROGRESS NOTES
"Name: Inna Hill      : 1965      MRN: [id number]  Encounter Provider: Abhishek Pena MD  Encounter Date: 2025   Encounter department: [facility] [place]  :  Assessment & Plan  Constipation, unspecified constipation type    Orders:  •  Ambulatory referral to Gastroenterology    Bloating    Orders:  •  dicyclomine (BENTYL) 20 mg tablet; Take 1 tablet (20 mg total) by mouth 4 (four) times a day as needed (Bloating)    Alternating constipation and diarrhea             History of Present Illness   HPI  Inna Hill is a 59 y.o. female who presents with bloating.  Patient is concerned about this.  She has a history of irritable bowel.  She has alternating constipation and diarrhea.  This has been going on for several years.  She was treated for IBS-D with rifaximin but this did nothing.  She has never tried any antispasmodics.  She has no alarm symptomatology whatsoever.  EGD and colonoscopy in  were unremarkable.  Patient continues to smoke.      Review of Systems   Gastrointestinal:         As per HPI   All other systems reviewed and are negative.       Objective   /74   Pulse 67   Ht 5' 1\" (1.549 m)   Wt 67.3 kg (148 lb 6.4 oz)   SpO2 96%   BMI 28.04 kg/m²      Physical Exam  Constitutional:       Appearance: Normal appearance. She is normal weight.   HENT:      Head: Normocephalic.   Eyes:      Pupils: Pupils are equal, round, and reactive to light.   Cardiovascular:      Rate and Rhythm: Normal rate and regular rhythm.      Pulses: Normal pulses.      Heart sounds: Normal heart sounds.   Pulmonary:      Effort: Pulmonary effort is normal.      Breath sounds: Normal breath sounds.   Abdominal:      General: Abdomen is flat. Bowel sounds are normal.      Palpations: Abdomen is soft.   Musculoskeletal:         General: Normal range of motion.      Cervical back: Neck supple.   Skin:     General: Skin is warm and dry.   Neurological:      " General: No focal deficit present.      Mental Status: She is alert and oriented to person, place, and time.   Psychiatric:         Mood and Affect: Mood normal.         Behavior: Behavior normal.

## 2025-04-17 NOTE — ASSESSMENT & PLAN NOTE
Orders:    dicyclomine (BENTYL) 20 mg tablet; Take 1 tablet (20 mg total) by mouth 4 (four) times a day as needed (Bloating)

## 2025-04-24 DIAGNOSIS — G43.909 MIGRAINE WITHOUT STATUS MIGRAINOSUS, NOT INTRACTABLE, UNSPECIFIED MIGRAINE TYPE: ICD-10-CM

## 2025-04-24 DIAGNOSIS — I49.3 PVC (PREMATURE VENTRICULAR CONTRACTION): ICD-10-CM

## 2025-04-24 RX ORDER — PROPRANOLOL HYDROCHLORIDE 120 MG/1
120 CAPSULE, EXTENDED RELEASE ORAL DAILY
Qty: 90 CAPSULE | Refills: 1 | Status: SHIPPED | OUTPATIENT
Start: 2025-04-24